# Patient Record
Sex: FEMALE | Race: WHITE | NOT HISPANIC OR LATINO | Employment: OTHER | URBAN - METROPOLITAN AREA
[De-identification: names, ages, dates, MRNs, and addresses within clinical notes are randomized per-mention and may not be internally consistent; named-entity substitution may affect disease eponyms.]

---

## 2023-08-28 PROBLEM — E78.5 HYPERLIPIDEMIA: Status: ACTIVE | Noted: 2022-01-31

## 2023-08-28 PROBLEM — K21.9 HIATAL HERNIA WITH GERD: Status: ACTIVE | Noted: 2022-01-31

## 2023-08-28 PROBLEM — K44.9 HIATAL HERNIA WITH GERD: Status: ACTIVE | Noted: 2022-01-31

## 2023-08-28 PROBLEM — G47.33 OBSTRUCTIVE SLEEP APNEA OF ADULT: Status: ACTIVE | Noted: 2022-01-31

## 2023-08-28 PROBLEM — R13.10 DYSPHAGIA, UNSPECIFIED: Status: ACTIVE | Noted: 2022-01-31

## 2023-08-28 PROBLEM — F98.8 ATTENTION DEFICIT DISORDER (ADD) WITHOUT HYPERACTIVITY: Status: ACTIVE | Noted: 2022-08-10

## 2023-08-28 PROBLEM — E55.9 VITAMIN D DEFICIENCY: Status: ACTIVE | Noted: 2022-01-31

## 2023-08-28 PROBLEM — N18.31 CHRONIC RENAL FAILURE, STAGE 3A (HCC): Status: ACTIVE | Noted: 2022-08-10

## 2023-08-28 PROBLEM — R73.03 PRE-DIABETES: Status: ACTIVE | Noted: 2022-01-31

## 2023-08-28 NOTE — PATIENT INSTRUCTIONS
[facility]: 7a-1pm Mon-    Please complete lab work fasting, will reach out with results    We are so happy to have you at our practice. Reach out anytime!

## 2023-08-28 NOTE — PROGRESS NOTES
Name: Zaki Morgan      :       MRN: [id number]  Encounter Provider: ELI Hester  Encounter Date: 2023   Encounter department: [address] [place] [address] PRIMARY CARE    Assessment & Plan     Patient presents today to establish care at our practice. Baseline labs ordered. DEXA done 5/15/23 with [address], care gap request sent    Negative cologuard test from 2023    See [facility] specific charting    Follow up in 3 months or sooner pending test results      1. Encounter to establish care  -     CBC and differential; Future  -     Comprehensive metabolic panel; Future  -     Lipid Panel with Direct LDL reflex; Future  -     Hemoglobin A1C; Future  -     Vitamin D 25 hydroxy; Future    2. Chronic renal failure, stage 3a ([address])  Assessment & Plan:  No results found for: "EGFR", "CREATININE"     Expressing concerns today regarding her kidney function. She reports "I saw in my blood work I have stage III kidney disease and no one ever told me". Reassurance was provided to the patient. She has a history of prediabetes but has been well controlled. She does not have a history of hypertension and her blood pressure is well controlled today. She has been increasing her fluid intake over the past year and follows with a nutritionist.  She reports losing approximately 30 pounds over the past year. She does have a history however of long-term NSAID use due to arthritis and fibromyalgia. This likely is a contributing factor. Will recheck metabolic panel as well as check a protein creatinine ratio urine test    Orders:  -     CBC and differential; Future  -     Comprehensive metabolic panel; Future  -     Protein / creatinine ratio, urine; Future    3. Obesity (BMI 30-39. 9)  Assessment & Plan:  Follows with Dr. Tory Loya nutrition team, reports losing "30 lbs over the past year", focus of program "is clean eating"    Orders:  -     Lipid Panel with Direct LDL reflex; Future  -     Hemoglobin A1C; Future    4. Hyperlipidemia, unspecified hyperlipidemia type  Assessment & Plan:  Noted in history, will recheck lipid panel  Note from past PCP states "patient is not tolerant to statins"    Orders:  -     Lipid Panel with Direct LDL reflex; Future    5. Pre-diabetes  Assessment & Plan:  Last A1c was 6.1% last year, will recheck hemoglobin A1c with baseline lab work    Orders:  -     Hemoglobin A1C; Future    6. Fibromyalgia  Assessment & Plan:  Patient used to follow with the [address] [place] [address] for this problem. She was on DMARDs for "quite a few years" as she originally was misdiagnosed as having rheumatoid arthritis. She now follows with [facility] and [address] [place] [address] neurosurgery, takes duloxetine 60 mg daily for mental health and fibromyalgia. 7. Vitamin D deficiency  Assessment & Plan:  Currently on cholecalciferol 2000 units daily, will add vitamin D lab to baseline lab work to reassess    Orders:  -     Vitamin D 25 hydroxy; Future    8. Need for hepatitis C screening test  -     Hepatitis C Antibody; Future    9. Attention deficit disorder (ADD) without hyperactivity  Assessment & Plan:  Well-controlled with Strattera 40 mg daily, will continue to monitor      10. Depression, unspecified depression type  Assessment & Plan:  [facility] managed by last P[facility]. Reports good control of symptoms with bupropion 150 mg daily and duloxetine 60 mg daily. 11. PTSD (post-traumatic stress disorder)  Assessment & Plan:  [facility], was most recently managed by last PCP. Reports good control of symptoms with bupropion 150 mg daily and duloxetine 60 mg daily. 12. Hiatal hernia with GERD  Assessment & Plan:  Patient reports good control with pantoprazole 40 mg daily, follows with DDA as needed      13.  Osteoarthritis, unspecified osteoarthritis type, unspecified site  Assessment & Plan:  Patient has an extensive musculoskeletal health history. She has had multiple joint replacement surgeries and revisions. She currently follows with orthopedics at [facility], she also follows with [address] [place] [address] neurosurgery team for management of right lower extremity radiculopathy        I have spent a total time of 60 minutes on 08/30/23 in caring for this patient including Risks and benefits of tx options, Instructions for management, Patient and family education, Importance of tx compliance, Documenting in the medical record, Reviewing / ordering tests, medicine, procedures   and Obtaining or reviewing history  . BMI Counseling: Body mass index is 34.3 kg/m². The BMI is above normal. Nutrition recommendations include encouraging healthy choices of fruits and vegetables and consuming healthier snacks. Exercise recommendations include exercising 3-5 times per week. Rationale for BMI follow-up plan is due to patient being overweight or obese. Subjective      Presents to establish care  Primary concern: CKD    See [facility] specific charting    Review of Systems   Constitutional: Negative. HENT: Negative. Eyes: Negative. Respiratory: Negative. Cardiovascular: Negative. Gastrointestinal: Negative. Endocrine: Negative. Genitourinary: Negative. Musculoskeletal: Positive for arthralgias. Skin: Negative. Allergic/Immunologic: Negative. Neurological: Negative. Hematological: Negative. Psychiatric/Behavioral: Negative.         Current Outpatient Medications on File Prior to Visit   Medication Sig   • atoMOXetine (STRATTERA) 40 mg capsule    • buPROPion (WELLBUTRIN XL) 150 mg 24 hr tablet    • cetirizine (ZyrTEC) 10 mg tablet Take by mouth   • Cholecalciferol 50 MCG (2000 UT) CAPS Take 1 capsule by mouth in the morning   • Cyanocobalamin (VITAMIN B-12 PO) Take by mouth   • DULoxetine (CYMBALTA) 60 mg delayed release capsule    • meloxicam (MOBIC) 15 mg tablet Take 1 tablet by mouth every morning   • nystatin (MYCOSTATIN) powder Apply 1 Application topically daily   • pantoprazole (PROTONIX) 40 mg tablet Take 40 mg by mouth daily   • [DISCONTINUED] ergocalciferol (ERGOCALCIFEROL) 1.25 MG (09508 UT) capsule Take by mouth   • [DISCONTINUED] omeprazole (PriLOSEC) 40 MG capsule Take by mouth (Patient not taking: Reported on 8/30/2023)   • [DISCONTINUED] pregabalin (LYRICA) 75 mg capsule Take by mouth (Patient not taking: Reported on 8/30/2023)   • [DISCONTINUED] venlafaxine (EFFEXOR-XR) 150 mg 24 hr capsule Take by mouth (Patient not taking: Reported on 8/30/2023)       Objective     /80 (BP Location: Left arm, Patient Position: Sitting, Cuff Size: Large)   Pulse 84   Temp 98.2 °F (36.8 °C) (Tympanic)   Resp 16   Ht 5' 1.5" (1.562 m)   Wt 83.7 kg (184 lb 8.4 oz)   SpO2 98%   BMI 34.30 kg/m²     Physical Exam  Constitutional:       General: She is not in acute distress. Appearance: Normal appearance. HENT:      Nose: Nose normal.   Eyes:      Extraocular Movements: Extraocular movements intact. Conjunctiva/sclera: Conjunctivae normal.   Cardiovascular:      Rate and Rhythm: Normal rate and regular rhythm. Heart sounds: Normal heart sounds. Pulmonary:      Effort: Pulmonary effort is normal. No respiratory distress. Breath sounds: Normal breath sounds. No wheezing. Abdominal:      General: Abdomen is flat. Musculoskeletal:         General: No swelling or deformity. Normal range of motion. Cervical back: Normal range of motion. Skin:     Findings: No erythema or rash. Neurological:      Mental Status: She is alert and oriented to person, place, and time.       Gait: Gait abnormal.   Psychiatric:         Mood and Affect: Mood normal.         Behavior: Behavior normal.       ELI Quarles

## 2023-08-29 ENCOUNTER — TELEPHONE (OUTPATIENT)
Dept: ADMINISTRATIVE | Facility: OTHER | Age: 78
End: 2023-08-29

## 2023-08-29 NOTE — TELEPHONE ENCOUNTER
Upon review of the In Basket request we IT is working on correcting the [employer]. The requested item will be linked by the system at that time. Any additional questions or concerns should be emailed to the Practice Liaisons via the appropriate education email address, please do not reply via In Basket.     Thank you  Mart Talavera MA

## 2023-08-29 NOTE — TELEPHONE ENCOUNTER
----- Message from Geneva Ramos, [facility] sent at 8/28/2023  2:17 PM EDT -----  Regarding: Care Gap Request  08/28/23 2:17 PM    Hello, our patient attached above has had CRC: Cologuard completed/performed. Please assist in updating the patient chart by pulling the Care Everywhere (CE) document. The date of service is 5/8/23.      Thank you,  ELI Stroud  [facility] Dr[facility]

## 2023-08-30 ENCOUNTER — TELEPHONE (OUTPATIENT)
Dept: ADMINISTRATIVE | Facility: OTHER | Age: 78
End: 2023-08-30

## 2023-08-30 ENCOUNTER — OFFICE VISIT (OUTPATIENT)
Age: 78
End: 2023-08-30

## 2023-08-30 VITALS
HEART RATE: 84 BPM | SYSTOLIC BLOOD PRESSURE: 120 MMHG | WEIGHT: 184.53 LBS | RESPIRATION RATE: 16 BRPM | DIASTOLIC BLOOD PRESSURE: 80 MMHG | TEMPERATURE: 98.2 F | OXYGEN SATURATION: 98 % | HEIGHT: 62 IN | BODY MASS INDEX: 33.96 KG/M2

## 2023-08-30 DIAGNOSIS — F43.10 PTSD (POST-TRAUMATIC STRESS DISORDER): ICD-10-CM

## 2023-08-30 DIAGNOSIS — E66.9 OBESITY (BMI 30-39.9): ICD-10-CM

## 2023-08-30 DIAGNOSIS — Z76.89 ENCOUNTER TO ESTABLISH CARE: Primary | ICD-10-CM

## 2023-08-30 DIAGNOSIS — M79.7 FIBROMYALGIA: ICD-10-CM

## 2023-08-30 DIAGNOSIS — F32.A DEPRESSION, UNSPECIFIED DEPRESSION TYPE: ICD-10-CM

## 2023-08-30 DIAGNOSIS — R73.03 PRE-DIABETES: ICD-10-CM

## 2023-08-30 DIAGNOSIS — Z11.59 NEED FOR HEPATITIS C SCREENING TEST: ICD-10-CM

## 2023-08-30 DIAGNOSIS — K21.9 HIATAL HERNIA WITH GERD: ICD-10-CM

## 2023-08-30 DIAGNOSIS — M19.90 OSTEOARTHRITIS, UNSPECIFIED OSTEOARTHRITIS TYPE, UNSPECIFIED SITE: ICD-10-CM

## 2023-08-30 DIAGNOSIS — K44.9 HIATAL HERNIA WITH GERD: ICD-10-CM

## 2023-08-30 DIAGNOSIS — F98.8 ATTENTION DEFICIT DISORDER (ADD) WITHOUT HYPERACTIVITY: ICD-10-CM

## 2023-08-30 DIAGNOSIS — E78.5 HYPERLIPIDEMIA, UNSPECIFIED HYPERLIPIDEMIA TYPE: ICD-10-CM

## 2023-08-30 DIAGNOSIS — E55.9 VITAMIN D DEFICIENCY: ICD-10-CM

## 2023-08-30 DIAGNOSIS — N18.31 CHRONIC RENAL FAILURE, STAGE 3A (HCC): ICD-10-CM

## 2023-08-30 PROBLEM — R13.10 DYSPHAGIA, UNSPECIFIED: Status: RESOLVED | Noted: 2022-01-31 | Resolved: 2023-08-30

## 2023-08-30 RX ORDER — CETIRIZINE HYDROCHLORIDE 10 MG/1
TABLET ORAL
COMMUNITY

## 2023-08-30 RX ORDER — MELOXICAM 15 MG/1
1 TABLET ORAL EVERY MORNING
COMMUNITY
Start: 1998-05-25 | End: 2024-01-04

## 2023-08-30 RX ORDER — VENLAFAXINE HYDROCHLORIDE 150 MG/1
CAPSULE, EXTENDED RELEASE ORAL
COMMUNITY
End: 2023-08-30

## 2023-08-30 RX ORDER — OMEPRAZOLE 40 MG/1
CAPSULE, DELAYED RELEASE ORAL
COMMUNITY
End: 2023-08-30

## 2023-08-30 RX ORDER — PREGABALIN 75 MG/1
CAPSULE ORAL
COMMUNITY
End: 2023-08-30

## 2023-08-30 RX ORDER — NYSTATIN 100000 [USP'U]/G
1 POWDER TOPICAL DAILY
COMMUNITY
Start: 2022-12-21 | End: 2023-12-21

## 2023-08-30 RX ORDER — PANTOPRAZOLE SODIUM 40 MG/1
40 TABLET, DELAYED RELEASE ORAL DAILY
COMMUNITY

## 2023-08-30 RX ORDER — BUPROPION HYDROCHLORIDE 150 MG/1
TABLET ORAL
COMMUNITY
Start: 2023-07-31

## 2023-08-30 RX ORDER — ERGOCALCIFEROL 1.25 MG/1
CAPSULE ORAL
COMMUNITY
End: 2023-08-30

## 2023-08-30 RX ORDER — DULOXETIN HYDROCHLORIDE 60 MG/1
CAPSULE, DELAYED RELEASE ORAL
COMMUNITY
Start: 2023-07-29

## 2023-08-30 RX ORDER — ATOMOXETINE 40 MG/1
CAPSULE ORAL
COMMUNITY
Start: 2023-07-31

## 2023-08-30 NOTE — ASSESSMENT & PLAN NOTE
Patient used to follow with the [place] for this problem. She was on DMARDs for "quite a few years" as she originally was misdiagnosed as having rheumatoid arthritis. She now follows with [facility] and [address] [place] [address] neurosurgery, takes duloxetine 60 mg daily for mental health and fibromyalgia.

## 2023-08-30 NOTE — ASSESSMENT & PLAN NOTE
No results found for: "EGFR", "CREATININE"     Expressing concerns today regarding her kidney function. She reports "I saw in my blood work I have stage III kidney disease and no one ever told me". Reassurance was provided to the patient. She has a history of prediabetes but has been well controlled. She does not have a history of hypertension and her blood pressure is well controlled today. She has been increasing her fluid intake over the past year and follows with a nutritionist.  She reports losing approximately 30 pounds over the past year. She does have a history however of long-term NSAID use due to arthritis and fibromyalgia. This likely is a contributing factor.     Will recheck metabolic panel as well as check a protein creatinine ratio urine test

## 2023-08-30 NOTE — ASSESSMENT & PLAN NOTE
Noted in history, will recheck lipid panel  Note from past PCP states "patient is not tolerant to statins"

## 2023-08-30 NOTE — ASSESSMENT & PLAN NOTE
Currently on cholecalciferol 2000 units daily, will add vitamin D lab to baseline lab work to reassess

## 2023-08-30 NOTE — ASSESSMENT & PLAN NOTE
Patient has an extensive musculoskeletal health history. She has had multiple joint replacement surgeries and revisions.   She currently follows with orthopedics at [facility], she also follows with [address] [place] [address] neurosurgery team for management of right lower extremity radiculopathy

## 2023-08-30 NOTE — ASSESSMENT & PLAN NOTE
Follows with Dr. Eduardo Villa nutrition team, reports losing "30 lbs over the past year", focus of program "is clean eating"

## 2023-08-30 NOTE — TELEPHONE ENCOUNTER
----- Message from Christel Donaldson sent at 8/30/2023 11:08 AM EDT -----  Regarding: Care Gap Request  08/30/23 11:08 AM    Hello, our patient attached above has had DEXA Scan completed/performed. Please assist in updating the patient chart by pulling the Care Everywhere (CE) document. The date of service is 5/15/23 with [address] in [place].      Thank you,  ELI Donaldson  [facility] Dr[facility]

## 2023-08-30 NOTE — ASSESSMENT & PLAN NOTE
Used to be managed by the [facility], was most recently managed by last PCP. Reports good control of symptoms with bupropion 150 mg daily and duloxetine 60 mg daily.

## 2023-08-30 NOTE — TELEPHONE ENCOUNTER
Upon review of the In Basket request we were able to locate, review, and update the patient chart as requested for DEXA Scan. Any additional questions or concerns should be emailed to the Practice Liaisons via the appropriate education email address, please do not reply via In Basket.     Thank you  Karla Vieira

## 2023-08-30 NOTE — ASSESSMENT & PLAN NOTE
Used to be managed by the [address] [place] [address], was most recently managed by last PCP. Reports good control of symptoms with bupropion 150 mg daily and duloxetine 60 mg daily.

## 2023-09-01 ENCOUNTER — APPOINTMENT (OUTPATIENT)
Age: 78
End: 2023-09-01
Payer: MEDICARE

## 2023-09-01 DIAGNOSIS — Z11.59 NEED FOR HEPATITIS C SCREENING TEST: ICD-10-CM

## 2023-09-01 DIAGNOSIS — R73.03 PRE-DIABETES: ICD-10-CM

## 2023-09-01 DIAGNOSIS — N18.31 CHRONIC RENAL FAILURE, STAGE 3A (HCC): ICD-10-CM

## 2023-09-01 DIAGNOSIS — E78.5 HYPERLIPIDEMIA, UNSPECIFIED HYPERLIPIDEMIA TYPE: ICD-10-CM

## 2023-09-01 DIAGNOSIS — Z76.89 ENCOUNTER TO ESTABLISH CARE: ICD-10-CM

## 2023-09-01 DIAGNOSIS — E55.9 VITAMIN D DEFICIENCY: ICD-10-CM

## 2023-09-01 DIAGNOSIS — E66.9 OBESITY (BMI 30-39.9): ICD-10-CM

## 2023-09-01 LAB
25(OH)D3 SERPL-MCNC: 43 NG/ML (ref 30–100)
ALBUMIN SERPL BCP-MCNC: 3.9 G/DL (ref 3.5–5)
ALP SERPL-CCNC: 95 U/L (ref 34–104)
ALT SERPL W P-5'-P-CCNC: 9 U/L (ref 7–52)
ANION GAP SERPL CALCULATED.3IONS-SCNC: 11 MMOL/L
AST SERPL W P-5'-P-CCNC: 18 U/L (ref 13–39)
BASOPHILS # BLD AUTO: 0.05 THOUSANDS/ÂΜL (ref 0–0.1)
BASOPHILS NFR BLD AUTO: 1 % (ref 0–1)
BILIRUB SERPL-MCNC: 0.47 MG/DL (ref 0.2–1)
BUN SERPL-MCNC: 22 MG/DL (ref 5–25)
CALCIUM SERPL-MCNC: 9 MG/DL (ref 8.4–10.2)
CHLORIDE SERPL-SCNC: 102 MMOL/L (ref 96–108)
CHOLEST SERPL-MCNC: 260 MG/DL
CO2 SERPL-SCNC: 27 MMOL/L (ref 21–32)
CREAT SERPL-MCNC: 1.07 MG/DL (ref 0.6–1.3)
CREAT UR-MCNC: 72 MG/DL
EOSINOPHIL # BLD AUTO: 0.16 THOUSAND/ÂΜL (ref 0–0.61)
EOSINOPHIL NFR BLD AUTO: 3 % (ref 0–6)
ERYTHROCYTE [DISTWIDTH] IN BLOOD BY AUTOMATED COUNT: 12.6 % (ref 11.6–15.1)
GFR SERPL CREATININE-BSD FRML MDRD: 50 ML/MIN/1.73SQ M
GLUCOSE P FAST SERPL-MCNC: 100 MG/DL (ref 65–99)
HCT VFR BLD AUTO: 45.1 % (ref 34.8–46.1)
HDLC SERPL-MCNC: 67 MG/DL
HGB BLD-MCNC: 14.4 G/DL (ref 11.5–15.4)
IMM GRANULOCYTES # BLD AUTO: 0.02 THOUSAND/UL (ref 0–0.2)
IMM GRANULOCYTES NFR BLD AUTO: 0 % (ref 0–2)
LDLC SERPL CALC-MCNC: 177 MG/DL (ref 0–100)
LYMPHOCYTES # BLD AUTO: 1.6 THOUSANDS/ÂΜL (ref 0.6–4.47)
LYMPHOCYTES NFR BLD AUTO: 26 % (ref 14–44)
MCH RBC QN AUTO: 30.7 PG (ref 26.8–34.3)
MCHC RBC AUTO-ENTMCNC: 31.9 G/DL (ref 31.4–37.4)
MCV RBC AUTO: 96 FL (ref 82–98)
MONOCYTES # BLD AUTO: 0.39 THOUSAND/ÂΜL (ref 0.17–1.22)
MONOCYTES NFR BLD AUTO: 6 % (ref 4–12)
NEUTROPHILS # BLD AUTO: 3.85 THOUSANDS/ÂΜL (ref 1.85–7.62)
NEUTS SEG NFR BLD AUTO: 64 % (ref 43–75)
NRBC BLD AUTO-RTO: 0 /100 WBCS
PLATELET # BLD AUTO: 257 THOUSANDS/UL (ref 149–390)
PMV BLD AUTO: 10.3 FL (ref 8.9–12.7)
POTASSIUM SERPL-SCNC: 4.2 MMOL/L (ref 3.5–5.3)
PROT SERPL-MCNC: 6.9 G/DL (ref 6.4–8.4)
PROT UR-MCNC: 5 MG/DL
PROT/CREAT UR: 0.07 MG/G{CREAT} (ref 0–0.1)
RBC # BLD AUTO: 4.69 MILLION/UL (ref 3.81–5.12)
SODIUM SERPL-SCNC: 140 MMOL/L (ref 135–147)
TRIGL SERPL-MCNC: 81 MG/DL
WBC # BLD AUTO: 6.07 THOUSAND/UL (ref 4.31–10.16)

## 2023-09-01 PROCEDURE — 83036 HEMOGLOBIN GLYCOSYLATED A1C: CPT

## 2023-09-01 PROCEDURE — 82306 VITAMIN D 25 HYDROXY: CPT

## 2023-09-01 PROCEDURE — 80061 LIPID PANEL: CPT

## 2023-09-01 PROCEDURE — 84156 ASSAY OF PROTEIN URINE: CPT

## 2023-09-01 PROCEDURE — 86803 HEPATITIS C AB TEST: CPT

## 2023-09-01 PROCEDURE — 82570 ASSAY OF URINE CREATININE: CPT

## 2023-09-01 PROCEDURE — 85025 COMPLETE CBC W/AUTO DIFF WBC: CPT

## 2023-09-01 PROCEDURE — 80053 COMPREHEN METABOLIC PANEL: CPT

## 2023-09-01 PROCEDURE — 36415 COLL VENOUS BLD VENIPUNCTURE: CPT

## 2023-09-02 LAB — HCV AB SER QL: NORMAL

## 2023-09-05 LAB
EST. AVERAGE GLUCOSE BLD GHB EST-MCNC: 126 MG/DL
HBA1C MFR BLD: 6 %

## 2023-09-06 ENCOUNTER — TELEPHONE (OUTPATIENT)
Age: 78
End: 2023-09-06

## 2023-11-29 NOTE — PATIENT INSTRUCTIONS
COMPLETE LAB WORK 1 WEEK PRIOR TO FOLLOW-UP    Medicare Preventive Visit Patient Instructions  Thank you for completing your Welcome to Medicare Visit or Medicare Annual Wellness Visit today. Your next wellness visit will be due in one year (11/29/2024). The screening/preventive services that you may require over the next 5-10 years are detailed below. Some tests may not apply to you based off risk factors and/or age. Screening tests ordered at today's visit but not completed yet may show as past due. Also, please note that scanned in results may not display below. Preventive Screenings:  Service Recommendations Previous Testing/Comments   Colorectal Cancer Screening  * Colonoscopy    * Fecal Occult Blood Test (FOBT)/Fecal Immunochemical Test (FIT)  * Fecal DNA/Cologuard Test  * Flexible Sigmoidoscopy Age: 43-73 years old   Colonoscopy: every 10 years (may be performed more frequently if at higher risk)  OR  FOBT/FIT: every 1 year  OR  Cologuard: every 3 years  OR  Sigmoidoscopy: every 5 years  Screening may be recommended earlier than age 39 if at higher risk for colorectal cancer. Also, an individualized decision between you and your healthcare provider will decide whether screening between the ages of 77-80 would be appropriate. Colonoscopy: 05/08/2023  FOBT/FIT: Not on file  Cologuard: Not on file  Sigmoidoscopy: Not on file          Breast Cancer Screening Age: 36 years old  Frequency: every 1-2 years  Not required if history of left and right mastectomy Mammogram: 03/30/2023        Cervical Cancer Screening Between the ages of 21-29, pap smear recommended once every 3 years. Between the ages of 32-69, can perform pap smear with HPV co-testing every 5 years.    Recommendations may differ for women with a history of total hysterectomy, cervical cancer, or abnormal pap smears in past. Pap Smear: Not on file        Hepatitis C Screening Once for adults born between 1945 and 1965  More frequently in patients at high risk for Hepatitis C Hep C Antibody: 09/01/2023        Diabetes Screening 1-2 times per year if you're at risk for diabetes or have pre-diabetes Fasting glucose: 100 mg/dL (9/1/2023)  A1C: 6.0 % (9/1/2023)      Cholesterol Screening Once every 5 years if you don't have a lipid disorder. May order more often based on risk factors. Lipid panel: 09/01/2023          Other Preventive Screenings Covered by Medicare:  Abdominal Aortic Aneurysm (AAA) Screening: covered once if your at risk. You're considered to be at risk if you have a family history of AAA. Lung Cancer Screening: covers low dose CT scan once per year if you meet all of the following conditions: (1) Age 48-67; (2) No signs or symptoms of lung cancer; (3) Current smoker or have quit smoking within the last 15 years; (4) You have a tobacco smoking history of at least 20 pack years (packs per day multiplied by number of years you smoked); (5) You get a written order from a healthcare provider. Glaucoma Screening: covered annually if you're considered high risk: (1) You have diabetes OR (2) Family history of glaucoma OR (3)  aged 48 and older OR (3)  American aged 72 and older  Osteoporosis Screening: covered every 2 years if you meet one of the following conditions: (1) You're estrogen deficient and at risk for osteoporosis based off medical history and other findings; (2) Have a vertebral abnormality; (3) On glucocorticoid therapy for more than 3 months; (4) Have primary hyperparathyroidism; (5) On osteoporosis medications and need to assess response to drug therapy. Last bone density test (DXA Scan): 05/15/2023. HIV Screening: covered annually if you're between the age of 14-79. Also covered annually if you are younger than 13 and older than 72 with risk factors for HIV infection. For pregnant patients, it is covered up to 3 times per pregnancy.     Immunizations:  Immunization Recommendations   Influenza Vaccine Annual influenza vaccination during flu season is recommended for all persons aged >= 6 months who do not have contraindications   Pneumococcal Vaccine   * Pneumococcal conjugate vaccine = PCV13 (Prevnar 13), PCV15 (Vaxneuvance), PCV20 (Prevnar 20)  * Pneumococcal polysaccharide vaccine = PPSV23 (Pneumovax) Adults 98-22 yo with certain risk factors or if 69+ yo  If never received any pneumonia vaccine: recommend Prevnar 20 (PCV20)  Give PCV20 if previously received 1 dose of PCV13 or PPSV23   Hepatitis B Vaccine 3 dose series if at intermediate or high risk (ex: diabetes, end stage renal disease, liver disease)   Respiratory syncytial virus (RSV) Vaccine - COVERED BY MEDICARE PART D  * RSVPreF3 (Arexvy) CDC recommends that adults 61years of age and older may receive a single dose of RSV vaccine using shared clinical decision-making (SCDM)   Tetanus (Td) Vaccine - COST NOT COVERED BY MEDICARE PART B Following completion of primary series, a booster dose should be given every 10 years to maintain immunity against tetanus. Td may also be given as tetanus wound prophylaxis. Tdap Vaccine - COST NOT COVERED BY MEDICARE PART B Recommended at least once for all adults. For pregnant patients, recommended with each pregnancy. Shingles Vaccine (Shingrix) - COST NOT COVERED BY MEDICARE PART B  2 shot series recommended in those 19 years and older who have or will have weakened immune systems or those 50 years and older     Health Maintenance Due:      Topic Date Due    Hepatitis C Screening  Completed     Immunizations Due:      Topic Date Due    Pneumococcal Vaccine: 65+ Years (3 - PPSV23 if available, else PCV20) 08/27/2016     Advance Directives   What are advance directives? Advance directives are legal documents that state your wishes and plans for medical care. These plans are made ahead of time in case you lose your ability to make decisions for yourself.  Advance directives can apply to any medical decision, such as the treatments you want, and if you want to donate organs. What are the types of advance directives? There are many types of advance directives, and each state has rules about how to use them. You may choose a combination of any of the following:  Living will: This is a written record of the treatment you want. You can also choose which treatments you do not want, which to limit, and which to stop at a certain time. This includes surgery, medicine, IV fluid, and tube feedings. Durable power of  for healthcare [facility]): This is a written record that states who you want to make healthcare choices for you when you are unable to make them for yourself. This person, called a proxy, is usually a family member or a friend. You may choose more than 1 proxy. Do not resuscitate (DNR) order:  A DNR order is used in case your heart stops beating or you stop breathing. It is a request not to have certain forms of treatment, such as CPR. A DNR order may be included in other types of advance directives. Medical directive: This covers the care that you want if you are in a coma, near death, or unable to make decisions for yourself. You can list the treatments you want for each condition. Treatment may include pain medicine, surgery, blood transfusions, dialysis, IV or tube feedings, and a ventilator (breathing machine). Values history: This document has questions about your views, beliefs, and how you feel and think about life. This information can help others choose the care that you would choose. Why are advance directives important? An advance directive helps you control your care. Although spoken wishes may be used, it is better to have your wishes written down. Spoken wishes can be misunderstood, or not followed. Treatments may be given even if you do not want them. An advance directive may make it easier for your family to make difficult choices about your care.    Weight Management   Why it is important to manage your weight:  Being overweight increases your risk of health conditions such as heart disease, high blood pressure, type 2 diabetes, and certain types of cancer. It can also increase your risk for osteoarthritis, sleep apnea, and other respiratory problems. Aim for a slow, steady weight loss. Even a small amount of weight loss can lower your risk of health problems. How to lose weight safely:  A safe and healthy way to lose weight is to eat fewer calories and get regular exercise. You can lose up about 1 pound a week by decreasing the number of calories you eat by 500 calories each day. Healthy meal plan for weight management:  A healthy meal plan includes a variety of foods, contains fewer calories, and helps you stay healthy. A healthy meal plan includes the following:  Eat whole-grain foods more often. A healthy meal plan should contain fiber. Fiber is the part of grains, fruits, and vegetables that is not broken down by your body. Whole-grain foods are healthy and provide extra fiber in your diet. Some examples of whole-grain foods are whole-wheat breads and pastas, oatmeal, brown rice, and bulgur. Eat a variety of vegetables every day. Include dark, leafy greens such as spinach, kale, mansoor greens, and mustard greens. Eat yellow and orange vegetables such as carrots, sweet potatoes, and winter squash. Eat a variety of fruits every day. Choose fresh or canned fruit (canned in its own juice or light syrup) instead of juice. Fruit juice has very little or no fiber. Eat low-fat dairy foods. Drink fat-free (skim) milk or 1% milk. Eat fat-free yogurt and low-fat cottage cheese. Try low-fat cheeses such as mozzarella and other reduced-fat cheeses. Choose meat and other protein foods that are low in fat. Choose beans or other legumes such as split peas or lentils. Choose fish, skinless poultry (chicken or turkey), or lean cuts of red meat (beef or pork).  Before you cook meat or poultry, cut off any visible fat. Use less fat and oil. Try baking foods instead of frying them. Add less fat, such as margarine, sour cream, regular salad dressing and mayonnaise to foods. Eat fewer high-fat foods. Some examples of high-fat foods include french fries, doughnuts, ice cream, and cakes. Eat fewer sweets. Limit foods and drinks that are high in sugar. This includes candy, cookies, regular soda, and sweetened drinks. Exercise:  Exercise at least 30 minutes per day on most days of the week. Some examples of exercise include walking, biking, dancing, and swimming. You can also fit in more physical activity by taking the stairs instead of the elevator or parking farther away from stores. Ask your healthcare provider about the best exercise plan for you. © Copyright [employer] 2018 Information is for End User's use only and may not be sold, redistributed or otherwise used for commercial purposes.  All illustrations and images included in CareNotes® are the copyrighted property of [employer].A[employer]M., Inc. or  [employer]

## 2023-11-30 ENCOUNTER — OFFICE VISIT (OUTPATIENT)
Age: 78
End: 2023-11-30
Payer: MEDICARE

## 2023-11-30 VITALS
OXYGEN SATURATION: 98 % | RESPIRATION RATE: 16 BRPM | DIASTOLIC BLOOD PRESSURE: 84 MMHG | HEART RATE: 82 BPM | SYSTOLIC BLOOD PRESSURE: 130 MMHG | BODY MASS INDEX: 35.42 KG/M2 | WEIGHT: 187.61 LBS | HEIGHT: 61 IN

## 2023-11-30 DIAGNOSIS — Z13.0 SCREENING FOR DEFICIENCY ANEMIA: ICD-10-CM

## 2023-11-30 DIAGNOSIS — M25.511 CHRONIC RIGHT SHOULDER PAIN: ICD-10-CM

## 2023-11-30 DIAGNOSIS — N18.31 CHRONIC RENAL FAILURE, STAGE 3A (HCC): ICD-10-CM

## 2023-11-30 DIAGNOSIS — F32.A DEPRESSION, UNSPECIFIED DEPRESSION TYPE: ICD-10-CM

## 2023-11-30 DIAGNOSIS — Z00.00 MEDICARE ANNUAL WELLNESS VISIT, INITIAL: Primary | ICD-10-CM

## 2023-11-30 DIAGNOSIS — Z23 ENCOUNTER FOR IMMUNIZATION: ICD-10-CM

## 2023-11-30 DIAGNOSIS — G89.29 CHRONIC RIGHT SHOULDER PAIN: ICD-10-CM

## 2023-11-30 DIAGNOSIS — E66.01 OBESITY, MORBID (HCC): ICD-10-CM

## 2023-11-30 DIAGNOSIS — G24.5 EYE TWITCH: ICD-10-CM

## 2023-11-30 DIAGNOSIS — E78.5 HYPERLIPIDEMIA, UNSPECIFIED HYPERLIPIDEMIA TYPE: ICD-10-CM

## 2023-11-30 DIAGNOSIS — R73.03 PRE-DIABETES: ICD-10-CM

## 2023-11-30 PROCEDURE — 99214 OFFICE O/P EST MOD 30 MIN: CPT | Performed by: NURSE PRACTITIONER

## 2023-11-30 PROCEDURE — G0438 PPPS, INITIAL VISIT: HCPCS | Performed by: NURSE PRACTITIONER

## 2023-11-30 RX ORDER — PREGABALIN 75 MG/1
CAPSULE ORAL
COMMUNITY

## 2023-11-30 NOTE — ASSESSMENT & PLAN NOTE
Ongoing since 2022, first noticed after knee surgeries   "Acting up" over the past month  Meloxicam and Tylenol: not working    Per shared decision making, will refer to Dr. Lenora Del Valle for eval and treatment

## 2023-11-30 NOTE — ASSESSMENT & PLAN NOTE
Likely secondary to long-term NSAID use  Advised patient to use NSAIDs only as needed  Discussed adequate hydration  Discussed the importance of blood pressure and blood sugar control    We will recheck metabolic panel prior to follow-up    Lab Results   Component Value Date    EGFR 50 09/01/2023    CREATININE 1.07 09/01/2023

## 2023-11-30 NOTE — ASSESSMENT & PLAN NOTE
Patient reports left eye twitch, ongoing for several months  Patient saw the eye doctor, patient states "they blew it off"  Patient reports that the eye twitching is bothersome  Patient denies any associated/correlating symptoms  Denies vision changes to left eye  Notices frequently while driving    Neuro exam benign today. Patient follows with [address] [place] [address] neurology. I strongly advised that the patient contact her neurology team for further evaluation.

## 2023-11-30 NOTE — ASSESSMENT & PLAN NOTE
Lab Results   Component Value Date    HGBA1C 6.0 (H) 09/01/2023       PRIMARY CARE PHYSICIAN: ELI Plasencia  Most recent appointment with PCP: 11/30/2023  Next appointment with PCP: 06/05/2024    Continue heart healthy diabetic diet  Exercise as tolerated

## 2023-11-30 NOTE — PROGRESS NOTES
Assessment/Plan:    Encouraged neurology follow-up to further assess left eye twitching. Neuro exam benign today. Repeat lab work prior to follow-up    Referral placed to orthopedics, Dr. Uriel Nevarez, for evaluation and treatment of right shoulder pain      Obesity, morbid ([address])  Following with Dr. Yared Tsang in [facility] for weight loss  "I've been slacking lately"    Chronic right shoulder pain  Ongoing since 2022, first noticed after knee surgeries   "Acting up" over the past month  Meloxicam and Tylenol: not working    Per shared decision making, will refer to Dr. Uriel Nevarez for eval and treatment    Eye twitch  Patient reports left eye twitch, ongoing for several months  Patient saw the eye doctor, patient states "they blew it off"  Patient reports that the eye twitching is bothersome  Patient denies any associated/correlating symptoms  Denies vision changes to left eye  Notices frequently while driving    Neuro exam benign today. Patient follows with [address] [place] [address] neurology. I strongly advised that the patient contact her neurology team for further evaluation. Chronic renal failure, stage 3a (HCC)  Likely secondary to long-term NSAID use  Advised patient to use NSAIDs only as needed  Discussed adequate hydration  Discussed the importance of blood pressure and blood sugar control    We will recheck metabolic panel prior to follow-up    Lab Results   Component Value Date    EGFR 50 09/01/2023    CREATININE 1.07 09/01/2023       Depression  Patient follows with the [address] [place] [address] for management of mental health conditions such as depression and ADHD    Hyperlipidemia  LDL continues to be above goal.  ASCVD risk of 18.7%. Patient reports she is intolerant to statins, "they made my legs feel bad and negatively affected my walking". I discussed the risks associated with elevated cholesterol values, also discussed the clinical significance of her current ASCVD risk score. Patient is hesitant to retry statins.   I provided the patient with an educational handout on ezetimibe, patient expressed moderate interest.  Patient plans on researching this medication and may be willing to start it at her next follow-up if necessary. Will recheck lipid panel prior to follow-up    Pre-diabetes  Lab Results   Component Value Date    HGBA1C 6.0 (H) 09/01/2023       PRIMARY CARE PHYSICIAN: ELI Villela  Most recent appointment with PCP: 11/30/2023  Next appointment with PCP: 06/05/2024    Continue heart healthy diabetic diet  Exercise as tolerated        Subjective:      Patient ID: Griselda Grullon is a 70 y.o. female. Routine follow-up to reassess chronic health conditions, last visit 3 months ago  Primary concern today: R shoulder pain and L eye twitch         The following portions of the patient's history were reviewed and updated as appropriate: allergies, current medications, past family history, past medical history, past social history, past surgical history, and problem list.    Review of Systems   Constitutional: Negative. HENT: Negative. Eyes: Negative. Negative for photophobia, pain, discharge, redness, itching and visual disturbance. L eye twitch   Respiratory: Negative. Cardiovascular: Negative. Gastrointestinal: Negative. Endocrine: Negative. Genitourinary: Negative. Musculoskeletal:  Positive for arthralgias. Skin: Negative. Allergic/Immunologic: Negative. Neurological: Negative. Negative for dizziness, tremors, seizures, syncope, facial asymmetry, speech difficulty, weakness, light-headedness, numbness and headaches. Hematological: Negative. Psychiatric/Behavioral: Negative. Objective:      /84 (BP Location: Left arm, Patient Position: Sitting, Cuff Size: Large)   Pulse 82   Resp 16   Ht 5' 1" (1.549 m)   Wt 85.1 kg (187 lb 9.8 oz)   SpO2 98%   BMI 35.45 kg/m²          Physical Exam  Constitutional:       General: She is not in acute distress. Appearance: Normal appearance. She is not ill-appearing. HENT:      Nose: Nose normal.   Eyes:      General: No visual field deficit. Right eye: No discharge. Left eye: No discharge. Extraocular Movements: Extraocular movements intact. Conjunctiva/sclera: Conjunctivae normal.      Pupils: Pupils are equal, round, and reactive to light. Cardiovascular:      Rate and Rhythm: Normal rate and regular rhythm. Heart sounds: Normal heart sounds. Pulmonary:      Effort: Pulmonary effort is normal. No respiratory distress. Breath sounds: Normal breath sounds. No wheezing. Abdominal:      General: Abdomen is flat. Musculoskeletal:         General: No swelling or deformity. Normal range of motion. Right shoulder: Tenderness and bony tenderness present. No swelling or deformity. Normal range of motion. Left shoulder: No swelling, deformity, tenderness or bony tenderness. Normal range of motion. Cervical back: Normal range of motion. Skin:     Findings: No erythema or rash. Neurological:      Mental Status: She is alert and oriented to person, place, and time. Cranial Nerves: Cranial nerves 2-12 are intact. No cranial nerve deficit, dysarthria or facial asymmetry. Sensory: Sensation is intact. Motor: Motor function is intact. No weakness, tremor or pronator drift. Coordination: Coordination is intact. Romberg sign negative.  Rapid alternating movements normal.   Psychiatric:         Mood and Affect: Mood normal.         Behavior: Behavior normal.

## 2023-11-30 NOTE — ASSESSMENT & PLAN NOTE
LDL continues to be above goal.  ASCVD risk of 18.7%. Patient reports she is intolerant to statins, "they made my legs feel bad and negatively affected my walking". I discussed the risks associated with elevated cholesterol values, also discussed the clinical significance of her current ASCVD risk score. Patient is hesitant to retry statins. I provided the patient with an educational handout on ezetimibe, patient expressed moderate interest.  Patient plans on researching this medication and may be willing to start it at her next follow-up if necessary.   Will recheck lipid panel prior to follow-up

## 2023-11-30 NOTE — LETTER
November 30, 2023     MIGUELITO Morales MD  [id number] [facility]    Patient: Holley Lanes   YOB: 1945   Date of Visit: 11/30/2023       Dear Dr. Ronnie Paniagua: Thank you for referring Holley Lanes to me for evaluation. Below are my notes for this consultation. If you have questions, please do not hesitate to call me. I look forward to following your patient along with you.          Sincerely,        ELI Mendoza        CC: No Recipients

## 2023-11-30 NOTE — ASSESSMENT & PLAN NOTE
Following with Dr. Misael Abraham in [facility] for weight loss  "I've been slacking lately"

## 2023-11-30 NOTE — PROGRESS NOTES
Answers submitted by the patient for this visit:  Medicare Annual Wellness Visit (Submitted on 11/24/2023)  How would you rate your overall health?: good  Compared to last year, how is your physical health?: slightly better  In general, how satisfied are you with your life?: dissatisfied  Compared to last year, how is your eyesight?: same  Compared to last year, how is your hearing?: same  Compared to last year, how is your emotional/mental health?: same  How often is anger a problem for you?: never, rarely  How often do you feel unusually tired/fatigued?: often  In the past 7 days, how much pain have you experienced?: a lot  If you answered "some" or "a lot", please rate the severity of your pain on a scale of 1 to 10 (1 being the least severe pain and 10 being the most intense pain). : 6/10  In the past 6 months, have you lost or gained 10 pounds without trying?: No  One or more falls in the last year: Yes  In the past 6 months, have you accidentally leaked urine?: No  Do you have trouble with the stairs inside or outside your home?: Yes  Does your home have working smoke alarms?: No  Does your home have a carbon monoxide monitor?: No  Which safety hazards (if any) have you experienced in your home? Please select all that apply.: household clutter  How would you describe your current diet?  Please select all that apply.: Unhealthy  In addition to prescription medications, are you taking any over-the-counter supplements?: Yes  If yes, what supplements are you taking?: Tylenol  Can you manage your medications?: Yes  Are you currently taking any opioid medications?: No  Can you walk and transfer into and out of your bed and chair?: Yes  Can you dress and groom yourself?: Yes  Can you bathe or shower yourself?: Yes  Can you feed yourself?: Yes  Can you do your laundry/ housekeeping?: Yes  Can you manage your money, pay your bills, and track your expenses?: Yes  Can you make your own meals?: Yes  Can you do your own shopping?: Yes  Within the last 12 months, have you had any hospitalizations or Emergency Department visits?: No  Do you have a living will?: No  Do you have a Durable POA (Power of ) for healthcare decisions?: No  Do you have an Advanced Directive for end of life decisions?: No  How often have you used an illegal drug (including marijuana) or a prescription medication for non-medical reasons in the past year?: never  What is the typical number of drinks you consume in a day?: 0  What is the typical number of drinks you consume in a week?: 0  How often did you have a drink containing alcohol in the past year?: never  How many drinks did you have on a typical day  when you were drinking in the past year?: 0  How often did you have 6 or more drinks on one occasion in the past year?: never   Assessment and Plan:     Problem List Items Addressed This Visit        Nervous and Auditory    Eye twitch     Patient reports left eye twitch, ongoing for several months  Patient saw the eye doctor, patient states "they blew it off"  Patient reports that the eye twitching is bothersome  Patient denies any associated/correlating symptoms  Denies vision changes to left eye  Notices frequently while driving    Neuro exam benign today. Patient follows with [address] [place] [address] neurology. I strongly advised that the patient contact her neurology team for further evaluation.          Relevant Medications    pregabalin (Lyrica) 75 mg capsule       Genitourinary    Chronic renal failure, stage 3a ([address])     Likely secondary to long-term NSAID use  Advised patient to use NSAIDs only as needed  Discussed adequate hydration  Discussed the importance of blood pressure and blood sugar control    We will recheck metabolic panel prior to follow-up    Lab Results   Component Value Date    EGFR 50 09/01/2023    CREATININE 1.07 09/01/2023            Relevant Orders    CBC and differential    Comprehensive metabolic panel       Other Depression     Patient follows with the [facility] for management of mental health conditions such as depression and ADHD         Hyperlipidemia     LDL continues to be above goal.  ASCVD risk of 18.7%. Patient reports she is intolerant to statins, "they made my legs feel bad and negatively affected my walking". I discussed the risks associated with elevated cholesterol values, also discussed the clinical significance of her current ASCVD risk score. Patient is hesitant to retry statins. I provided the patient with an educational handout on ezetimibe, patient expressed moderate interest.  Patient plans on researching this medication and may be willing to start it at her next follow-up if necessary.   Will recheck lipid panel prior to follow-up         Relevant Orders    Lipid Panel with Direct LDL reflex    Pre-diabetes     Lab Results   Component Value Date    HGBA1C 6.0 (H) 09/01/2023       PRIMARY CARE PHYSICIAN: ELI Morocho  Most recent appointment with PCP: 11/30/2023  Next appointment with PCP: 06/05/2024    Continue heart healthy diabetic diet  Exercise as tolerated           Relevant Orders    Hemoglobin A1C    Obesity, morbid ([address] [facility])     Following with Dr. Fercho Chao in [facility] for weight loss  "I've been slacking lately"         Chronic right shoulder pain     Ongoing since 2022, first noticed after knee surgeries   "Acting up" over the past month  Meloxicam and Tylenol: not working    Per shared decision making, will refer to Dr. Echo Culver for eval and treatment         Relevant Orders    Ambulatory Referral to Orthopedic Surgery   Other Visit Diagnoses     Medicare annual wellness visit, initial    -  Primary    Encounter for immunization        Relevant Medications    RSVPreF3 Vac Recomb Adjuvanted 120 MCG/0.5ML SUSR    Screening for deficiency anemia        Relevant Orders    CBC and differential           Preventive health issues were discussed with patient, and age appropriate screening tests were ordered as noted in patient's After Visit Summary. Personalized health advice and appropriate referrals for health education or preventive services given if needed, as noted in patient's After Visit Summary. History of Present Illness:     Patient presents for a Medicare Wellness Visit    Patient presents today for Medicare annual wellness visit  Up-to-date with all preventative screenings  Provided list updated: [facility], [facility] neurology, and orthopedics through [facility]  Up-to-date with all vaccinations, COVID booster administered at [facility] in October  Patient also follows with podiatry, [facility]    Eye Problem   Pertinent negatives include no photophobia or weakness. Shoulder Pain     Depression  Associated symptoms include arthralgias. Pertinent negatives include no weakness. Patient Care Team:  Guerda Reynaga as PCP - General (Nurse Practitioner)     Review of Systems:     Review of Systems   Constitutional: Negative. HENT: Negative. Eyes: Negative. Negative for photophobia and visual disturbance. L eye twitch   Respiratory: Negative. Cardiovascular: Negative. Gastrointestinal: Negative. Endocrine: Negative. Genitourinary: Negative. Musculoskeletal:  Positive for arthralgias. Skin: Negative. Allergic/Immunologic: Negative. Neurological: Negative. Negative for dizziness, tremors, facial asymmetry, speech difficulty, weakness and light-headedness. Hematological: Negative. Psychiatric/Behavioral:  Positive for depression.          Problem List:     Patient Active Problem List   Diagnosis   • Attention deficit disorder (ADD) without hyperactivity   • Chronic renal failure, stage 3a (HCC)   • Depression   • Fibromyalgia   • Hiatal hernia with GERD   • Hyperlipidemia   • Obstructive sleep apnea of adult   • Osteoarthritis (arthritis due to wear and tear of joints)   • Pre-diabetes • PTSD (post-traumatic stress disorder)   • Vitamin D deficiency   • Obesity (BMI 30-39. 9)   • Obesity, morbid (HCC)   • Chronic right shoulder pain   • Eye twitch      Past Medical and Surgical History:     Past Medical History:   Diagnosis Date   • Arthritis    • Chronic kidney disease 2023   • Depression    • Dysphagia, unspecified 2022   • GERD (gastroesophageal reflux disease)     Don't remember   • Headache(784.0)     Migraine   • Memory loss    • Visual impairment      Past Surgical History:   Procedure Laterality Date   • BREAST SURGERY  1986    Bilat reduction   • EYE SURGERY  cataracts Aug & Sep 2022   • FRACTURE SURGERY  L wrist  2017   • HIP SURGERY  both replaced, L revised   • JOINT REPLACEMENT  TKR (R) , ,     Both hips   • KNEE SURGERY  R 1975,   • SPINE SURGERY  cervical , lumbar       Family History:     Family History   Problem Relation Age of Onset   • Coronary artery disease Mother          Mar 1989   • Diabetes Mother         Type 2   • Arthritis Father    • Vision loss Father         Macular Degeneration   • Coronary artery disease Brother          2020   • Diabetes Brother         Type 2   • Diabetes Brother         Type 2   • Cancer Brother         Leukemia        Social History:     Social History     Socioeconomic History   • Marital status: Single     Spouse name: None   • Number of children: None   • Years of education: None   • Highest education level: None   Occupational History   • None   Tobacco Use   • Smoking status: Never   • Smokeless tobacco: Never   Vaping Use   • Vaping Use: Never used   Substance and Sexual Activity   • Alcohol use: Not Currently     Comment: maybe one during holidays   • Drug use: Never   • Sexual activity: Not Currently     Partners: Male     Birth control/protection: Abstinence, Condom Male   Other Topics Concern   • None   Social History Narrative   • None     Social Determinants of Health     Financial Resource Strain: Low Risk  (11/24/2023)    Overall Financial Resource Strain (CARDIA)    • Difficulty of Paying Living Expenses: Not hard at all   Food Insecurity: Not on file   Transportation Needs: No Transportation Needs (11/24/2023)    PRAPARE - Transportation    • Lack of Transportation (Medical): No    • Lack of Transportation (Non-Medical): No   Physical Activity: Not on file   Stress: Not on file   Social Connections: Not on file   Intimate Partner Violence: Not on file   Housing Stability: Not on file      Medications and Allergies:     Current Outpatient Medications   Medication Sig Dispense Refill   • atoMOXetine (STRATTERA) 40 mg capsule      • buPROPion (WELLBUTRIN XL) 150 mg 24 hr tablet      • cetirizine (ZyrTEC) 10 mg tablet Take by mouth     • Cholecalciferol 50 MCG (2000 UT) CAPS Take 1 capsule by mouth in the morning     • Cyanocobalamin (VITAMIN B-12 PO) Take by mouth     • DULoxetine (CYMBALTA) 60 mg delayed release capsule      • meloxicam (MOBIC) 15 mg tablet Take 1 tablet by mouth every morning     • nystatin (MYCOSTATIN) powder Apply 1 Application topically daily     • pantoprazole (PROTONIX) 40 mg tablet Take 40 mg by mouth daily     • RSVPreF3 Vac Recomb Adjuvanted 120 MCG/0.5ML SUSR Inject 0.5 mL into a muscle once for 1 dose 1 each 0   • pregabalin (Lyrica) 75 mg capsule Take by mouth (Patient not taking: Reported on 11/30/2023)       No current facility-administered medications for this visit.      Allergies   Allergen Reactions   • Gabapentin Other (See Comments)     "out of body experience"  "Out of body experience"     • Other Swelling     SURGICAL STEEL  pain   • Pregabalin Other (See Comments)     Blurred vision, off balance to the point point of falling   • Statins Arthralgia and Myalgia     Severe arthralgias   • Medical Tape Rash     welts        Immunizations:     Immunization History   Administered Date(s) Administered   • COVID-19 MODERNA VACC 0.5 ML IM 02/13/2021, 03/13/2021, 04/13/2021, 12/03/2021   • COVID-19 Moderna mRNA Vaccine 12 Yr+ 50 mcg/0.5 mL (Spikevax) 10/20/2023   • H1N1, All Formulations 12/10/2009   • INFLUENZA 11/04/1996, 10/27/1997, 12/18/2000, 11/19/2004, 11/23/2005, 10/01/2007, 11/01/2007, 10/01/2008, 10/23/2009, 10/08/2010, 10/01/2011, 10/12/2012, 09/11/2013, 09/19/2014, 10/16/2015, 10/01/2020   • Influenza Split 10/03/2022   • Influenza Split High Dose Preservative Free IM 10/30/2015, 10/19/2017, 03/13/2021, 09/14/2021   • Influenza, Seasonal Vaccine, Quadrivalent, Adjuvanted, .5e 09/14/2021, 10/03/2022   • Influenza, high dose seasonal 0.7 mL 10/05/2023   • Influenza, seasonal, injectable 11/04/1996, 10/27/1997, 09/11/2013, 10/01/2020   • Pneumococcal 11/14/2007, 12/17/2012   • Pneumococcal Conjugate 13-Valent 08/27/2015   • Pneumococcal Polysaccharide PPV23 11/01/2007   • Td (adult), Unspecified 04/21/2004, 04/24/2014   • Tdap 04/21/2004, 07/26/2010, 03/22/2018   • Zoster 03/08/2011   • Zoster Vaccine Recombinant 10/16/2019, 03/11/2020, 04/19/2023, 07/19/2023   • influenza, trivalent, adjuvanted 10/12/2012, 10/15/2016, 10/18/2018, 10/28/2019      Health Maintenance:         Topic Date Due   • Hepatitis C Screening  Completed         Topic Date Due   • Pneumococcal Vaccine: 65+ Years (3 - PPSV23 if available, else PCV20) 08/27/2016      Medicare Screening Tests and Risk Assessments:         Health Risk Assessment:   Patient rates overall health as good. Patient feels that their physical health rating is slightly better. Patient is dissatisfied with their life. Eyesight was rated as same. Hearing was rated as same. Patient feels that their emotional and mental health rating is same. Patients states they are never, rarely angry. Patient states they are often unusually tired/fatigued. Pain experienced in the last 7 days has been a lot. Patient's pain rating has been 6/10.  Patient states that she has experienced no weight loss or gain in last 6 months. Fall Risk Screening: In the past year, patient has experienced: history of falling in past year      Urinary Incontinence Screening:   Patient has not leaked urine accidently in the last six months. Home Safety:  Patient has trouble with stairs inside or outside of their home. Patient has no working smoke alarms and has no working carbon monoxide detector. Home safety hazards include: household clutter. Nutrition:   Current diet is Unhealthy. Medications:   Patient is currently taking over-the-counter supplements. OTC medications include: see medication list. Patient is able to manage medications. Activities of Daily Living (ADLs)/Instrumental Activities of Daily Living (IADLs):   Walk and transfer into and out of bed and chair?: Yes  Dress and groom yourself?: Yes    Bathe or shower yourself?: Yes    Feed yourself? Yes  Do your laundry/housekeeping?: Yes  Manage your money, pay your bills and track your expenses?: Yes  Make your own meals?: Yes    Do your own shopping?: Yes    Previous Hospitalizations:   Any hospitalizations or ED visits within the last 12 months?: No      Advance Care Planning:   Living will: No    Durable POA for healthcare: No    Advanced directive: No      PREVENTIVE SCREENINGS      Cardiovascular Screening:    General: Screening Current      Diabetes Screening:     General: Screening Current      Breast Cancer Screening:     General: Screening Current      Cervical Cancer Screening:    General: Screening Not Indicated      Osteoporosis Screening:    General: Screening Current      Lung Cancer Screening:     General: Screening Not Indicated      Hepatitis C Screening:    General: Screening Current    Screening, Brief Intervention, and Referral to Treatment (SBIRT)    Screening  Typical number of drinks in a day: 0  Typical number of drinks in a week: 0  Interpretation: Low risk drinking behavior.     AUDIT-C Screenin) How often did you have a drink containing alcohol in the past year? never  2) How many drinks did you have on a typical day when you were drinking in the past year? 0  3) How often did you have 6 or more drinks on one occasion in the past year? never    AUDIT-C Score: 0  Interpretation: Score 0-2 (female): Negative screen for alcohol misuse    Single Item Drug Screening:  How often have you used an illegal drug (including marijuana) or a prescription medication for non-medical reasons in the past year? never    Single Item Drug Screen Score: 0  Interpretation: Negative screen for possible drug use disorder    No results found. Physical Exam:     /84 (BP Location: Left arm, Patient Position: Sitting, Cuff Size: Large)   Pulse 82   Resp 16   Ht 5' 1" (1.549 m)   Wt 85.1 kg (187 lb 9.8 oz)   SpO2 98%   BMI 35.45 kg/m²     Physical Exam  Constitutional:       General: She is not in acute distress. Appearance: Normal appearance. HENT:      Nose: Nose normal.   Eyes:      Extraocular Movements: Extraocular movements intact. Conjunctiva/sclera: Conjunctivae normal.   Pulmonary:      Effort: Pulmonary effort is normal. No respiratory distress. Breath sounds: No wheezing. Abdominal:      General: Abdomen is flat. Musculoskeletal:         General: Normal range of motion. Cervical back: Normal range of motion. Skin:     Findings: No erythema or rash. Neurological:      Mental Status: She is alert and oriented to person, place, and time.    Psychiatric:         Mood and Affect: Mood normal.         Behavior: Behavior normal.          ELI Smith

## 2023-12-07 ENCOUNTER — OFFICE VISIT (OUTPATIENT)
Age: 78
End: 2023-12-07
Payer: MEDICARE

## 2023-12-07 ENCOUNTER — APPOINTMENT (OUTPATIENT)
Age: 78
End: 2023-12-07
Payer: MEDICARE

## 2023-12-07 VITALS
WEIGHT: 185 LBS | BODY MASS INDEX: 34.93 KG/M2 | SYSTOLIC BLOOD PRESSURE: 123 MMHG | HEART RATE: 88 BPM | HEIGHT: 61 IN | DIASTOLIC BLOOD PRESSURE: 82 MMHG

## 2023-12-07 DIAGNOSIS — M25.511 CHRONIC RIGHT SHOULDER PAIN: ICD-10-CM

## 2023-12-07 DIAGNOSIS — G89.29 CHRONIC RIGHT SHOULDER PAIN: ICD-10-CM

## 2023-12-07 DIAGNOSIS — M75.81 ROTATOR CUFF TENDONITIS, RIGHT: Primary | ICD-10-CM

## 2023-12-07 PROCEDURE — 20610 DRAIN/INJ JOINT/BURSA W/O US: CPT

## 2023-12-07 PROCEDURE — 99213 OFFICE O/P EST LOW 20 MIN: CPT

## 2023-12-07 PROCEDURE — 73030 X-RAY EXAM OF SHOULDER: CPT

## 2023-12-07 RX ORDER — METHYLPREDNISOLONE ACETATE 40 MG/ML
2 INJECTION, SUSPENSION INTRA-ARTICULAR; INTRALESIONAL; INTRAMUSCULAR; SOFT TISSUE
Status: COMPLETED | OUTPATIENT
Start: 2023-12-07 | End: 2023-12-07

## 2023-12-07 RX ORDER — BUPIVACAINE HYDROCHLORIDE 2.5 MG/ML
6 INJECTION, SOLUTION INFILTRATION; PERINEURAL
Status: COMPLETED | OUTPATIENT
Start: 2023-12-07 | End: 2023-12-07

## 2023-12-07 RX ADMIN — METHYLPREDNISOLONE ACETATE 2 ML: 40 INJECTION, SUSPENSION INTRA-ARTICULAR; INTRALESIONAL; INTRAMUSCULAR; SOFT TISSUE at 09:30

## 2023-12-07 RX ADMIN — BUPIVACAINE HYDROCHLORIDE 6 ML: 2.5 INJECTION, SOLUTION INFILTRATION; PERINEURAL at 09:30

## 2023-12-07 NOTE — PROGRESS NOTES
CHIEF COMPLAIN/REASON FOR VISIT  No chief complaint on file. HISTORY OF PRESENT ILLNESS  Scarlett Shelton is a RHD 70 y.o. female who presents for evaluation of their right shoulder. Patient said she first started experiencing pain in the right shoulder around June of 2022 after she had her knee replaced. Patient said she tried PT initially but did not find this beneficial. She feels the pain is worse at night. She describes the pain on the lateral portion of the shoulder. She admits to medical history of fibromyalgia. REVIEW OF SYSTEMS  Review of systems was performed and, woutside that mentioned in the HPI, it was negative for symptomology related to the integumentary, hematologic, immunologic, allergic, neurologic, cardiovascular, respiratory, GI or  systems. MEDICAL HISTORY  Patient Active Problem List   Diagnosis    Attention deficit disorder (ADD) without hyperactivity    Chronic renal failure, stage 3a (HCC)    Depression    Fibromyalgia    Hiatal hernia with GERD    Hyperlipidemia    Obstructive sleep apnea of adult    Osteoarthritis (arthritis due to wear and tear of joints)    Pre-diabetes    PTSD (post-traumatic stress disorder)    Vitamin D deficiency    Obesity (BMI 30-39. 9)    Obesity, morbid ([address])    Chronic right shoulder pain    Eye twitch       SURGICAL HISTORY  Past Surgical History:   Procedure Laterality Date    BREAST SURGERY  06/1986    Bilat reduction    EYE SURGERY  cataracts Aug & Sep 2022    FRACTURE SURGERY  L wrist  Apr 2017    HIP SURGERY  both replaced, L revised    JOINT REPLACEMENT  TKR (R) 1999, 2012, 2022    Both hips    KNEE SURGERY  R 02/1975,    SPINE SURGERY  cervical 2005, lumbar 2012       CURRENT MEDICATIONS    Current Outpatient Medications:     atoMOXetine (STRATTERA) 40 mg capsule, , Disp: , Rfl:     buPROPion (WELLBUTRIN XL) 150 mg 24 hr tablet, , Disp: , Rfl:     cetirizine (ZyrTEC) 10 mg tablet, Take by mouth, Disp: , Rfl:     Cholecalciferol 50 MCG (2000 UT) CAPS, Take 1 capsule by mouth in the morning, Disp: , Rfl:     Cyanocobalamin (VITAMIN B-12 PO), Take by mouth, Disp: , Rfl:     DULoxetine (CYMBALTA) 60 mg delayed release capsule, , Disp: , Rfl:     meloxicam (MOBIC) 15 mg tablet, Take 1 tablet by mouth every morning, Disp: , Rfl:     nystatin (MYCOSTATIN) powder, Apply 1 Application topically daily, Disp: , Rfl:     pantoprazole (PROTONIX) 40 mg tablet, Take 40 mg by mouth daily, Disp: , Rfl:     pregabalin (Lyrica) 75 mg capsule, Take by mouth (Patient not taking: Reported on 11/30/2023), Disp: , Rfl:     SOCIAL HISTORY  Social History     Socioeconomic History    Marital status: Single     Spouse name: Not on file    Number of children: Not on file    Years of education: Not on file    Highest education level: Not on file   Occupational History    Not on file   Tobacco Use    Smoking status: Never    Smokeless tobacco: Never   Vaping Use    Vaping Use: Never used   Substance and Sexual Activity    Alcohol use: Not Currently     Comment: maybe one during holidays    Drug use: Never    Sexual activity: Not Currently     Partners: Male     Birth control/protection: Abstinence, Condom Male   Other Topics Concern    Not on file   Social History Narrative    Not on file     Social Determinants of Health     Financial Resource Strain: Low Risk  (11/24/2023)    Overall Financial Resource Strain (CARDIA)     Difficulty of Paying Living Expenses: Not hard at all   Food Insecurity: Not on file   Transportation Needs: No Transportation Needs (11/24/2023)    PRAPARE - Transportation     Lack of Transportation (Medical): No     Lack of Transportation (Non-Medical): No   Physical Activity: Not on file   Stress: Not on file   Social Connections: Not on file   Intimate Partner Violence: Not on file   Housing Stability: Not on file       Objective     VITAL SIGNS  There were no vitals taken for this visit.      PHYSICAL EXAM  General:   Well-appearing  No acute distress  Appears stated age    Right Shoulder  Negative tenderness to palpation over the acromioclavicular joint  Negative tenderness to palpation over the long head of the biceps tendon  Shoulder effusion none present  Shoulder abduction 140 / 160  Shoulder forward flexion 140 / 160  Shoulder internal rotation back pocket / T12  Supraspinatus/ABD 5/5   Infraspinatus/ER 5/5   Subscapularis 5/5  Negative Belly Press/SS  Positive Neer  Positive Huston  Positive O'briens  Skin is intact with no erythema, warmth or drainage  5/5 strength in the deltoid, biceps, triceps, wrist extension, wrist flexion, interossei, OP, EPL  Sensation to light touch is normal in the axillary, radial, ulnar, and median nerve distributions. Radial pulse is 2+  There is no axillary lymphadenopathy    RADIOGRAPHIC EXAMINATION/DIAGNOSTICS:  Right shoulder x-rays show moderate osteoarthritis in the glenohumeral and AC joints    ASSESSMENT/PLAN:  Right shoulder pain, rotator cuff tendonitis    Today, we discussed conservative treatment options including but are certainly not limited to: Rest, ice, compression, elevation, activity modification, anti-inflammatory medication, physical therapy, and/or injections. We discussed benefits of each potential treatment option. After discussion, patient was agreeable to trying Rest, Ice, Compression, Elevation, Activity Modification, Anti-inflammatory Medication, and Corticosteroid Injection. Discussion had about benefits and risks of corticosteroid injection. She declined PT script. We will plan to follow up with the patient as needed. They are understanding that if the pain should worsen or they develop new symptoms to please reach out to us sooner. Patient is understanding and agreeable to this plan. Large joint arthrocentesis: R subacromial bursa  Universal Protocol:  Consent: Verbal consent obtained.   Risks and benefits: risks, benefits and alternatives were discussed  Consent given by: patient  Patient understanding: patient states understanding of the procedure being performed  Patient consent: the patient's understanding of the procedure matches consent given  Site marked: the operative site was marked  Patient identity confirmed: verbally with patient  Supporting Documentation  Indications: pain   Procedure Details  Location: shoulder - R subacromial bursa  Needle size: 22 G  Ultrasound guidance: no  Approach: posterior  Medications administered: 6 mL bupivacaine 0.25 %; 2 mL methylPREDNISolone acetate 40 mg/mL    Patient tolerance: patient tolerated the procedure well with no immediate complications

## 2024-04-18 ENCOUNTER — OFFICE VISIT (OUTPATIENT)
Age: 79
End: 2024-04-18
Payer: MEDICARE

## 2024-04-18 VITALS
BODY MASS INDEX: 38.33 KG/M2 | HEIGHT: 61 IN | DIASTOLIC BLOOD PRESSURE: 82 MMHG | WEIGHT: 203 LBS | SYSTOLIC BLOOD PRESSURE: 137 MMHG | HEART RATE: 74 BPM

## 2024-04-18 DIAGNOSIS — M75.81 ROTATOR CUFF TENDONITIS, RIGHT: Primary | ICD-10-CM

## 2024-04-18 PROCEDURE — 20610 DRAIN/INJ JOINT/BURSA W/O US: CPT | Performed by: ORTHOPAEDIC SURGERY

## 2024-04-18 PROCEDURE — 99214 OFFICE O/P EST MOD 30 MIN: CPT | Performed by: ORTHOPAEDIC SURGERY

## 2024-04-18 RX ORDER — METHYLPREDNISOLONE ACETATE 80 MG/ML
1 INJECTION, SUSPENSION INTRA-ARTICULAR; INTRALESIONAL; INTRAMUSCULAR; SOFT TISSUE
Status: COMPLETED | OUTPATIENT
Start: 2024-04-18 | End: 2024-04-18

## 2024-04-18 RX ORDER — BUPIVACAINE HYDROCHLORIDE 2.5 MG/ML
6 INJECTION, SOLUTION INFILTRATION; PERINEURAL
Status: COMPLETED | OUTPATIENT
Start: 2024-04-18 | End: 2024-04-18

## 2024-04-18 RX ADMIN — BUPIVACAINE HYDROCHLORIDE 6 ML: 2.5 INJECTION, SOLUTION INFILTRATION; PERINEURAL at 09:45

## 2024-04-18 RX ADMIN — METHYLPREDNISOLONE ACETATE 1 ML: 80 INJECTION, SUSPENSION INTRA-ARTICULAR; INTRALESIONAL; INTRAMUSCULAR; SOFT TISSUE at 09:45

## 2024-04-18 NOTE — PROGRESS NOTES
REASON FOR FOLLOW-UP  Gina Andersen is a 78 y.o. female who presents for follow-up of the right rotator cuff tendonitis.    HISTORY OF PRESENT ILLNESS  Following our last visit, we decided to initially treat her right rotator cuff tendonitis non-operatively. Our plan was to manage her symptoms conservatively with RICE, PT, and corticosteroid injection. Patient said she was doing well after the corticosteroid injection which provided good relief of her symptoms. She was also working with PT which went well. She said about a month ago she started developing shoulder pain again which she feels is different compared to before.    PHYSICAL EXAM  General:   Well-appearing  No acute distress  Appears stated age    right Shoulder  Shoulder effusion none present  Shoulder abduction 120 / 140  Shoulder forward flexion 120 / 140  Shoulder internal rotation back pocket  Supraspinatus/ABD 5/5   Infraspinatus/ER 5/5   Negative Belly Press/SS  Negative O'briens  Skin is intact with no erythema, warmth or drainage  Motor strength intact distally  Sensation to light touch is normal in the axillary, radial, ulnar, and median nerve distributions.  Fingers warm and perfused    DIAGNOSTIC IMAGING:  No new imaging today    IMPRESSION/REPORT/PLAN  Right rotator cuff tendonitis    Today, we discussed conservative treatment options including but are certainly not limited to: Rest, ice, compression, elevation, activity modification, anti-inflammatory medication, physical therapy, and/or injections.  We discussed benefits of each potential treatment option.  After discussion, patient was agreeable to trying Rest, Ice, Compression, Elevation, Activity Modification, Anti-inflammatory Medication, Physical Therapy, and Corticosteroid Injection.  We will plan to follow up with the patient as needed.  They are understanding that if the pain should worsen or they develop new symptoms to please reach out to us sooner. Patient is understanding  and agreeable to this plan.     Large joint arthrocentesis  Universal Protocol:  Consent: Verbal consent obtained.  Risks and benefits: risks, benefits and alternatives were discussed  Consent given by: patient  Patient understanding: patient states understanding of the procedure being performed  Patient consent: the patient's understanding of the procedure matches consent given  Site marked: the operative site was marked  Patient identity confirmed: verbally with patient  Supporting Documentation  Indications: pain   Procedure Details  Needle size: 22 G  Ultrasound guidance: no  Approach: posterior  Medications administered: 6 mL bupivacaine 0.25 %; 1 mL methylPREDNISolone acetate 80 mg/mL    Patient tolerance: patient tolerated the procedure well with no immediate complications  Dressing:  Sterile dressing applied          Scribe Attestation      I,:  Ken Ochoa PA-C am acting as a scribe while in the presence of the attending physician.:       I,:  Brian Andrade MD personally performed the services described in this documentation    as scribed in my presence.:

## 2024-05-29 ENCOUNTER — APPOINTMENT (OUTPATIENT)
Age: 79
End: 2024-05-29
Payer: MEDICARE

## 2024-05-29 DIAGNOSIS — R73.03 PRE-DIABETES: ICD-10-CM

## 2024-05-29 DIAGNOSIS — Z13.0 SCREENING FOR DEFICIENCY ANEMIA: ICD-10-CM

## 2024-05-29 DIAGNOSIS — E78.5 HYPERLIPIDEMIA, UNSPECIFIED HYPERLIPIDEMIA TYPE: ICD-10-CM

## 2024-05-29 DIAGNOSIS — N18.31 CHRONIC RENAL FAILURE, STAGE 3A (HCC): ICD-10-CM

## 2024-05-29 LAB
ALBUMIN SERPL BCP-MCNC: 4.2 G/DL (ref 3.5–5)
ALP SERPL-CCNC: 109 U/L (ref 34–104)
ALT SERPL W P-5'-P-CCNC: 12 U/L (ref 7–52)
ANION GAP SERPL CALCULATED.3IONS-SCNC: 8 MMOL/L (ref 4–13)
AST SERPL W P-5'-P-CCNC: 17 U/L (ref 13–39)
BASOPHILS # BLD AUTO: 0.06 THOUSANDS/ÂΜL (ref 0–0.1)
BASOPHILS NFR BLD AUTO: 1 % (ref 0–1)
BILIRUB SERPL-MCNC: 0.43 MG/DL (ref 0.2–1)
BUN SERPL-MCNC: 28 MG/DL (ref 5–25)
CALCIUM SERPL-MCNC: 9 MG/DL (ref 8.4–10.2)
CHLORIDE SERPL-SCNC: 103 MMOL/L (ref 96–108)
CHOLEST SERPL-MCNC: 310 MG/DL
CO2 SERPL-SCNC: 31 MMOL/L (ref 21–32)
CREAT SERPL-MCNC: 1.07 MG/DL (ref 0.6–1.3)
EOSINOPHIL # BLD AUTO: 0.21 THOUSAND/ÂΜL (ref 0–0.61)
EOSINOPHIL NFR BLD AUTO: 3 % (ref 0–6)
ERYTHROCYTE [DISTWIDTH] IN BLOOD BY AUTOMATED COUNT: 12.2 % (ref 11.6–15.1)
EST. AVERAGE GLUCOSE BLD GHB EST-MCNC: 126 MG/DL
GFR SERPL CREATININE-BSD FRML MDRD: 50 ML/MIN/1.73SQ M
GLUCOSE P FAST SERPL-MCNC: 113 MG/DL (ref 65–99)
HBA1C MFR BLD: 6 %
HCT VFR BLD AUTO: 46.2 % (ref 34.8–46.1)
HDLC SERPL-MCNC: 80 MG/DL
HGB BLD-MCNC: 14.8 G/DL (ref 11.5–15.4)
IMM GRANULOCYTES # BLD AUTO: 0.03 THOUSAND/UL (ref 0–0.2)
IMM GRANULOCYTES NFR BLD AUTO: 1 % (ref 0–2)
LDLC SERPL CALC-MCNC: 199 MG/DL (ref 0–100)
LYMPHOCYTES # BLD AUTO: 1.99 THOUSANDS/ÂΜL (ref 0.6–4.47)
LYMPHOCYTES NFR BLD AUTO: 31 % (ref 14–44)
MCH RBC QN AUTO: 30.8 PG (ref 26.8–34.3)
MCHC RBC AUTO-ENTMCNC: 32 G/DL (ref 31.4–37.4)
MCV RBC AUTO: 96 FL (ref 82–98)
MONOCYTES # BLD AUTO: 0.52 THOUSAND/ÂΜL (ref 0.17–1.22)
MONOCYTES NFR BLD AUTO: 8 % (ref 4–12)
NEUTROPHILS # BLD AUTO: 3.54 THOUSANDS/ÂΜL (ref 1.85–7.62)
NEUTS SEG NFR BLD AUTO: 56 % (ref 43–75)
NRBC BLD AUTO-RTO: 0 /100 WBCS
PLATELET # BLD AUTO: 276 THOUSANDS/UL (ref 149–390)
PMV BLD AUTO: 9.9 FL (ref 8.9–12.7)
POTASSIUM SERPL-SCNC: 4.6 MMOL/L (ref 3.5–5.3)
PROT SERPL-MCNC: 7.1 G/DL (ref 6.4–8.4)
RBC # BLD AUTO: 4.81 MILLION/UL (ref 3.81–5.12)
SODIUM SERPL-SCNC: 142 MMOL/L (ref 135–147)
TRIGL SERPL-MCNC: 154 MG/DL
WBC # BLD AUTO: 6.35 THOUSAND/UL (ref 4.31–10.16)

## 2024-05-29 PROCEDURE — 83036 HEMOGLOBIN GLYCOSYLATED A1C: CPT

## 2024-05-29 PROCEDURE — 80061 LIPID PANEL: CPT

## 2024-05-29 PROCEDURE — 80053 COMPREHEN METABOLIC PANEL: CPT

## 2024-05-29 PROCEDURE — 36415 COLL VENOUS BLD VENIPUNCTURE: CPT

## 2024-05-29 PROCEDURE — 85025 COMPLETE CBC W/AUTO DIFF WBC: CPT

## 2024-06-05 ENCOUNTER — OFFICE VISIT (OUTPATIENT)
Age: 79
End: 2024-06-05
Payer: MEDICARE

## 2024-06-05 VITALS
BODY MASS INDEX: 38.09 KG/M2 | OXYGEN SATURATION: 99 % | HEIGHT: 61 IN | HEART RATE: 94 BPM | DIASTOLIC BLOOD PRESSURE: 74 MMHG | WEIGHT: 201.72 LBS | SYSTOLIC BLOOD PRESSURE: 122 MMHG | TEMPERATURE: 97.6 F

## 2024-06-05 DIAGNOSIS — M19.90 OSTEOARTHRITIS, UNSPECIFIED OSTEOARTHRITIS TYPE, UNSPECIFIED SITE: ICD-10-CM

## 2024-06-05 DIAGNOSIS — R05.3 CHRONIC COUGH: ICD-10-CM

## 2024-06-05 DIAGNOSIS — M25.511 CHRONIC RIGHT SHOULDER PAIN: ICD-10-CM

## 2024-06-05 DIAGNOSIS — F32.A DEPRESSIVE DISORDER: ICD-10-CM

## 2024-06-05 DIAGNOSIS — R73.03 PRE-DIABETES: ICD-10-CM

## 2024-06-05 DIAGNOSIS — N18.31 CHRONIC RENAL FAILURE, STAGE 3A (HCC): ICD-10-CM

## 2024-06-05 DIAGNOSIS — R41.3 MEMORY CHANGES: ICD-10-CM

## 2024-06-05 DIAGNOSIS — G89.29 CHRONIC RIGHT SHOULDER PAIN: ICD-10-CM

## 2024-06-05 DIAGNOSIS — E78.5 HYPERLIPIDEMIA, UNSPECIFIED HYPERLIPIDEMIA TYPE: Primary | ICD-10-CM

## 2024-06-05 PROBLEM — E66.01 OBESITY, MORBID (HCC): Status: RESOLVED | Noted: 2023-11-30 | Resolved: 2024-06-05

## 2024-06-05 PROCEDURE — G2211 COMPLEX E/M VISIT ADD ON: HCPCS | Performed by: NURSE PRACTITIONER

## 2024-06-05 PROCEDURE — 99214 OFFICE O/P EST MOD 30 MIN: CPT | Performed by: NURSE PRACTITIONER

## 2024-06-05 RX ORDER — BUPROPION HYDROCHLORIDE 150 MG/1
150 TABLET ORAL EVERY MORNING
Qty: 90 TABLET | Refills: 3 | Status: SHIPPED | OUTPATIENT
Start: 2024-06-05

## 2024-06-05 RX ORDER — EZETIMIBE 10 MG/1
10 TABLET ORAL DAILY
Qty: 30 TABLET | Refills: 5 | Status: CANCELLED | OUTPATIENT
Start: 2024-06-05 | End: 2024-12-02

## 2024-06-05 RX ORDER — FLUTICASONE PROPIONATE 50 MCG
1 SPRAY, SUSPENSION (ML) NASAL
Qty: 9.9 ML | Refills: 3 | Status: SHIPPED | OUTPATIENT
Start: 2024-06-05

## 2024-06-05 NOTE — ASSESSMENT & PLAN NOTE
"Reported to b/l knees, hands, and shoulders  Recently saw ortho for R shoulder pain, \"the injection did not help one bit\"  Gait and ADLs greatly impacted per patient. \"No one is listening to me or helping me\"  Was doing PT in the past, \"it helped but I ran out\"  Requesting a referral to rheumatology   "

## 2024-06-05 NOTE — ASSESSMENT & PLAN NOTE
Secondary to long-term NSAID use  GFR stable  Discussed importance of blood pressure and blood sugar control  Will repeat in 6 to 12 months      Lab Results   Component Value Date    EGFR 50 05/29/2024    EGFR 50 09/01/2023    EGFR 55 (A) 08/27/2022    CREATININE 1.07 05/29/2024    CREATININE 1.07 09/01/2023    CREATININE 1.07 08/27/2022

## 2024-06-05 NOTE — ASSESSMENT & PLAN NOTE
Unresolved per patient report, recently received cortisone injection through orthopedics  Will refer to rheumatology per patient request

## 2024-06-05 NOTE — PROGRESS NOTES
"Ambulatory Visit  Name: Gina Andersen      : 1945      MRN: [id number]  Encounter Provider: ELI Segura  Encounter Date: 2024   Encounter department: [facility]    Assessment & Plan   1. Hyperlipidemia, unspecified hyperlipidemia type  Assessment & Plan:  Patient intolerant to statins  Does not wish to start ezetimibe today  Would likely benefit from PSK 9 inhibitors, referral placed to lipid clinic to discuss further  Orders:  -     Ambulatory Referral to Cardiology; Future  2. Osteoarthritis, unspecified osteoarthritis type, unspecified site  Assessment & Plan:  Reported to b/l knees, hands, and shoulders  Recently saw ortho for R shoulder pain, \"the injection did not help one bit\"  Gait and ADLs greatly impacted per patient. \"No one is listening to me or helping me\"  Was doing PT in the past, \"it helped but I ran out\"  Requesting a referral to rheumatology   Orders:  -     Ambulatory Referral to Rheumatology; Future  3. Memory changes  Assessment & Plan:  Patient wishes to start OTC Prevagen prior to any formal workup.  Patient attributes her memory difficulties to her mental health.  Will reassess at 6-month follow-up  4. Pre-diabetes  Assessment & Plan:  A1c stable and unchanged, at 6.0%  Recommended weight loss and low-carb diet  5. Chronic renal failure, stage 3a (HCC)  Assessment & Plan:  Secondary to long-term NSAID use  GFR stable  Discussed importance of blood pressure and blood sugar control  Will repeat in 6 to 12 months      Lab Results   Component Value Date    EGFR 50 2024    EGFR 50 2023    EGFR 55 (A) 2022    CREATININE 1.07 2024    CREATININE 1.07 2023    CREATININE 1.07 2022     6. Chronic right shoulder pain  Assessment & Plan:  Unresolved per patient report, recently received cortisone injection through orthopedics  Will refer to rheumatology per patient request  7. Depressive disorder  Assessment & " "Plan:  Wellbutrin refilled per patient request  Orders:  -     buPROPion (WELLBUTRIN XL) 150 mg 24 hr tablet; Take 1 tablet (150 mg total) by mouth every morning  8. Chronic cough  Assessment & Plan:  Patient with chronic cough \"for several months\"  Patient with a history of seasonal allergies, has not been consistent with taking cetirizine  Will have patient resume cetirizine and start fluticasone nasal spray to rule out postnasal drip as contributing factor  Patient reports her heartburn/GERD is currently well-controlled  Denies a history of asthma  If cough fails to improve, will consider chest x-ray  Orders:  -     fluticasone (FLONASE) 50 mcg/act nasal spray; 1 spray into each nostril daily at bedtime       History of Present Illness   {Disappearing Hyperlinks I Encounters * My Last Note * Since Last Visit * History :37288}  Routine follow-up to discuss recent lab work and reassess chronic conditions  Primary concern today: cristy Andersen is a 78 y.o. female here for evaluation of memory problems. She is accompanied by ALONE. Primary caregiver is patient. Patient lives alone. The family and the patient identify problems with changes in short term memory, ongoing for many years. She is not concerned about medication errors, wandering, driving, cooking, and inability to maintain adequate nutrition. Medication administration: patient self medicates. \"Can't remember tasks and misplace things frequently... maybe related to my PTSD.\" Memory \"no better or worse over the past few years\".     [facility]\"  - Used to follow with [facility] neurosurgery       Review of Systems   Constitutional: Negative.    HENT: Negative.     Eyes: Negative.    Respiratory: Negative.     Cardiovascular: Negative.    Gastrointestinal: Negative.    Endocrine: Negative.    Genitourinary: Negative.    Musculoskeletal: Negative.    Skin: Negative.    Allergic/Immunologic: Negative.  " "  Neurological: Negative.    Hematological: Negative.    Psychiatric/Behavioral: Negative.         Objective   {Disappearing Hyperlinks   Review Vitals * Enter New Vitals * Results Review * Labs * Imaging * Cardiology * Procedures * Lung Cancer Screening :50775}  /74 (BP Location: Left arm, Patient Position: Sitting, Cuff Size: Large)   Pulse 94   Temp 97.6 °F (36.4 °C) (Temporal)   Ht 5' 1\" (1.549 m)   Wt 91.5 kg (201 lb 11.5 oz)   SpO2 99%   BMI 38.11 kg/m²     Physical Exam  Constitutional:       General: She is not in acute distress.     Appearance: Normal appearance. She is obese.   HENT:      Nose: Nose normal.   Eyes:      Extraocular Movements: Extraocular movements intact.      Conjunctiva/sclera: Conjunctivae normal.   Cardiovascular:      Rate and Rhythm: Normal rate and regular rhythm.      Heart sounds: Normal heart sounds.   Pulmonary:      Effort: Pulmonary effort is normal. No respiratory distress.      Breath sounds: Normal breath sounds. No wheezing.   Abdominal:      General: Abdomen is flat.   Musculoskeletal:         General: Normal range of motion.      Cervical back: Normal range of motion.      Right lower leg: No edema.      Left lower leg: No edema.   Skin:     Findings: No erythema or rash.   Neurological:      Mental Status: She is alert and oriented to person, place, and time.   Psychiatric:         Mood and Affect: Mood normal.         Behavior: Behavior normal.     Administrative Statements {Disappearing Hyperlinks I  [facility]    Depression Screening Follow-up Plan: Patient's depression screening was positive with a PHQ-2 score of . Their PHQ-9 score was 13. Patient declines further evaluation by mental health professional and/or medications. They have no active suicidal ideations. Brief counseling provided and recommend additional follow-up/re-evaluation at next office visit.  "

## 2024-06-05 NOTE — ASSESSMENT & PLAN NOTE
Patient wishes to start OTC Prevagen prior to any formal workup.  Patient attributes her memory difficulties to her mental health.  Will reassess at 6-month follow-up

## 2024-06-05 NOTE — ASSESSMENT & PLAN NOTE
Patient intolerant to statins  Does not wish to start ezetimibe today  Would likely benefit from PSK 9 inhibitors, referral placed to lipid clinic to discuss further

## 2024-06-05 NOTE — ASSESSMENT & PLAN NOTE
"Patient with chronic cough \"for several months\"  Patient with a history of seasonal allergies, has not been consistent with taking cetirizine  Will have patient resume cetirizine and start fluticasone nasal spray to rule out postnasal drip as contributing factor  Patient reports her heartburn/GERD is currently well-controlled  Denies a history of asthma  If cough fails to improve, will consider chest x-ray  "

## 2024-07-03 DIAGNOSIS — M79.7 FIBROMYALGIA: Primary | ICD-10-CM

## 2024-07-03 RX ORDER — DULOXETIN HYDROCHLORIDE 60 MG/1
60 CAPSULE, DELAYED RELEASE ORAL DAILY
Qty: 30 CAPSULE | Refills: 0 | Status: SHIPPED | OUTPATIENT
Start: 2024-07-03

## 2024-07-19 DIAGNOSIS — M79.7 FIBROMYALGIA: ICD-10-CM

## 2024-07-19 RX ORDER — DULOXETIN HYDROCHLORIDE 60 MG/1
60 CAPSULE, DELAYED RELEASE ORAL DAILY
Qty: 90 CAPSULE | Refills: 0 | Status: SHIPPED | OUTPATIENT
Start: 2024-07-19

## 2024-07-19 NOTE — TELEPHONE ENCOUNTER
Requested medication(s) are due for refill today: Yes  Patient has already received a courtesy refill: No  Other reason request has been forwarded to provider:       Psychiatry: Antidepressants - SNRI [id number]/19/2024 02:57 PM   Protocol Details Valid encounter within last 6 months    Last BP in normal range and within 180 days      To be filled at: [facility] [place] - [address]           07/19/2024 - Rx Request: You and Boone Frederick  (Newest Message First)View All Conversations on this Encounter  July 19, 2024  Boone Frederick   to [facility]   DY    7/19/24  3:01 PM  Refill must be reviewed and completed by the office or provider. The refill is unable to be approved or denied by the medication management team.    Please review if your office will be taking over this prescription.

## 2024-08-09 ENCOUNTER — OFFICE VISIT (OUTPATIENT)
Dept: CARDIOLOGY CLINIC | Facility: CLINIC | Age: 79
End: 2024-08-09
Payer: MEDICARE

## 2024-08-09 VITALS
SYSTOLIC BLOOD PRESSURE: 132 MMHG | WEIGHT: 205 LBS | HEIGHT: 61 IN | HEART RATE: 100 BPM | BODY MASS INDEX: 38.71 KG/M2 | DIASTOLIC BLOOD PRESSURE: 74 MMHG

## 2024-08-09 DIAGNOSIS — E78.5 HYPERLIPIDEMIA, UNSPECIFIED HYPERLIPIDEMIA TYPE: ICD-10-CM

## 2024-08-09 DIAGNOSIS — E78.5 DYSLIPIDEMIA: Primary | ICD-10-CM

## 2024-08-09 DIAGNOSIS — M79.7 FIBROMYALGIA: ICD-10-CM

## 2024-08-09 PROCEDURE — 99204 OFFICE O/P NEW MOD 45 MIN: CPT | Performed by: INTERNAL MEDICINE

## 2024-08-09 RX ORDER — EZETIMIBE 10 MG/1
10 TABLET ORAL DAILY
Qty: 90 TABLET | Refills: 3 | Status: SHIPPED | OUTPATIENT
Start: 2024-08-09

## 2024-08-09 NOTE — PROGRESS NOTES
"      Cardiology             Gina Andersen  1945  [id number]              Assessment/Plan:    Dyslipidemia, uncontrolled  Prediabetes  CKD      Uncontrolled dyslipidemia with LDL cholesterol as high as 199 mg/dL 5/29/2024.  She has been intolerant to atorvastatin and simvastatin and is quite reluctant to try another statin medication unless absolutely necessary.  We will start with ezetimibe 10 mg daily to see what kind of effect she has.  If suboptimal, we may consider either bempedoic acid or Leqvio.  Will plan to repeat lipid panel before next visit in 4 months.      Follow up in 4 months after repeat lipid panel        Interval History:     This is a 78-year-old female with no prior cardiac history.  She has severe osteoarthritis, longstanding dyslipidemia, CKD, and prediabetes.    She has been referred to lipid clinic for uncontrolled dyslipidemia.  LDL cholesterol on 5/29/2024 was significant elevated at 199 mg/dL with level prior to that in 2023 of 177 mg/dL.  She has been reported to have statin intolerance, and states has had tried Lipitor and simvastatin in the past resulting in severe arthralgias.    She attempts to eat a healthy diet, not exercising regularly.    From a symptomatic standpoint she feels well without exertional chest pain, shortness of breath, dizziness, palpitations, lower extremity edema.  Prior hemoglobin A1c was 6.0.                     Vitals:  Vitals:    08/09/24 1500   BP: 132/74   Pulse: 100   Weight: 93 kg (205 lb)   Height: 5' 1\" (1.549 m)         Past Medical History:   Diagnosis Date   • Arthritis 1985   • Chronic kidney disease 03/2023   • Depression 1986   • Dysphagia, unspecified 01/31/2022   • GERD (gastroesophageal reflux disease)     Don't remember   • Headache(784.0) 1986    Migraine   • Hyperlipidemia    • Memory loss    • Visual impairment      Social History     Socioeconomic History   • Marital status: Single     Spouse name: Not on file   • Number of " children: Not on file   • Years of education: Not on file   • Highest education level: Not on file   Occupational History   • Not on file   Tobacco Use   • Smoking status: Never   • Smokeless tobacco: Never   Vaping Use   • Vaping status: Never Used   Substance and Sexual Activity   • Alcohol use: Not Currently     Comment: maybe one during holidays   • Drug use: Never   • Sexual activity: Not Currently     Partners: Male     Birth control/protection: Abstinence, Condom Male   Other Topics Concern   • Not on file   Social History Narrative   • Not on file     Social Determinants of Health     Financial Resource Strain: Low Risk  (2023)    Overall Financial Resource Strain (CARDIA)    • Difficulty of Paying Living Expenses: Not hard at all   Food Insecurity: Not on file   Transportation Needs: No Transportation Needs (2023)    PRAPARE - Transportation    • Lack of Transportation (Medical): No    • Lack of Transportation (Non-Medical): No   Physical Activity: Not on file   Stress: Not on file   Social Connections: Not on file   Intimate Partner Violence: Not on file   Housing Stability: Not on file      Family History   Problem Relation Age of Onset   • Coronary artery disease Mother          Mar 1989   • Diabetes Mother         Type 2   • Arthritis Father    • Vision loss Father         Macular Degeneration   • Coronary artery disease Brother          2020   • Diabetes Brother         Type 2   • Diabetes Brother         Type 2   • Cancer Brother         Leukemia       Past Surgical History:   Procedure Laterality Date   • BREAST SURGERY  1986    Bilat reduction   • EYE SURGERY  cataracts Aug & Sep 2022   • FRACTURE SURGERY  L wrist  2017   • HIP SURGERY  both replaced, L revised   • JOINT REPLACEMENT  TKR (R) , ,     Both hips   • KNEE SURGERY  R 1975,   • SPINE SURGERY  cervical , lumbar        Current Outpatient Medications:   •  atoMOXetine (STRATTERA)  40 mg capsule, Take 40 mg by mouth daily, Disp: , Rfl:   •  buPROPion (WELLBUTRIN XL) 150 mg 24 hr tablet, Take 1 tablet (150 mg total) by mouth every morning, Disp: 90 tablet, Rfl: 3  •  Cholecalciferol 50 MCG (2000 UT) CAPS, Take 1 capsule by mouth in the morning, Disp: , Rfl:   •  Cyanocobalamin (VITAMIN B-12 PO), Take by mouth, Disp: , Rfl:   •  DULoxetine (CYMBALTA) 60 mg delayed release capsule, Take 1 capsule (60 mg total) by mouth daily, Disp: 90 capsule, Rfl: 0  •  ezetimibe (ZETIA) 10 mg tablet, Take 1 tablet (10 mg total) by mouth daily, Disp: 90 tablet, Rfl: 3  •  fluticasone (FLONASE) 50 mcg/act nasal spray, 1 spray into each nostril daily at bedtime, Disp: 9.9 mL, Rfl: 3  •  meloxicam (MOBIC) 15 mg tablet, Take 1 tablet by mouth every morning, Disp: , Rfl:   •  pantoprazole (PROTONIX) 40 mg tablet, Take 40 mg by mouth daily, Disp: , Rfl:   •  nystatin (MYCOSTATIN) powder, Apply 1 Application topically daily, Disp: , Rfl:         Review of Systems:  Review of Systems      Physical Exam:  Physical Exam    This note was completed in part utilizing [employer] Software.  Grammatical errors, random word insertions, spelling mistakes, and incomplete sentences can be an occasional consequence of this system secondary to software limitations, ambient noise, and hardware issues.  If you have any questions or concerns about the content, text, or information contained within the body of this dictation, please contact the provider for clarification.

## 2024-08-09 NOTE — TELEPHONE ENCOUNTER
Reason for call:   [x] Refill   [] Prior Auth  [] Other:     Office:   [x] PCP/Provider -   [] Specialty/Provider -     Medication: DULoxetine (CYMBALTA) 60 mg delayed release capsule Take 1 capsule (60 mg total) by mouth daily,       Pharmacy: [facility] - [address]      Does the patient have enough for 3 days?   [x] Yes   [] No - Send as HP to POD

## 2024-08-11 RX ORDER — DULOXETIN HYDROCHLORIDE 60 MG/1
60 CAPSULE, DELAYED RELEASE ORAL DAILY
Qty: 90 CAPSULE | Refills: 1 | Status: SHIPPED | OUTPATIENT
Start: 2024-08-11

## 2024-10-11 ENCOUNTER — TELEPHONE (OUTPATIENT)
Dept: FAMILY MEDICINE CLINIC | Facility: CLINIC | Age: 79
End: 2024-10-11

## 2024-10-31 ENCOUNTER — CONSULT (OUTPATIENT)
Dept: RHEUMATOLOGY | Facility: CLINIC | Age: 79
End: 2024-10-31
Payer: MEDICARE

## 2024-10-31 VITALS
OXYGEN SATURATION: 94 % | BODY MASS INDEX: 40.02 KG/M2 | HEIGHT: 61 IN | SYSTOLIC BLOOD PRESSURE: 122 MMHG | WEIGHT: 212 LBS | DIASTOLIC BLOOD PRESSURE: 70 MMHG | HEART RATE: 104 BPM

## 2024-10-31 DIAGNOSIS — M19.90 OSTEOARTHRITIS, UNSPECIFIED OSTEOARTHRITIS TYPE, UNSPECIFIED SITE: ICD-10-CM

## 2024-10-31 PROCEDURE — 99204 OFFICE O/P NEW MOD 45 MIN: CPT | Performed by: INTERNAL MEDICINE

## 2024-10-31 NOTE — PROGRESS NOTES
This is a Rheumatology Consult seen at the request of Jaron BENITEZ      HPI: Gina Andersen is a 78 y/o female who presents for further evaluation chronic pain, fibromyalgia    She was told in the past she has fibromyalgia and has tried Gabapentin with severe adverse reaction    [facility]  [facility] and tried, MTX, Arava and HCQ and did not help. She was then told she has osteoarthritis.    Chronic arthralgias hips (s/p b/l hip replacement) and right knee replacement X 3     Also C spine and lumbar surgery, chronic neck and back pain, DDD, radiculopathy    Was seen per Ortho and had right shoulder injection with mild relief      --------------------------------------------------------------------------------------------------------        ROS:      - for: Fevers, Chills or sweats.  No HAs or scalp tenderness.  No jaw claudication.  No acute visual or eye changes.  No dry eyes.  No auditory complaints.  No oral lesions or ulcers.  No dry mouth.  No sore throat or cough.  No chest pains or palpitations.  No shortness of breath, dyspnea on exertion or wheezing.  No hemotpysis.  No abdominal pain, GERD symptoms, diarrhea or constipation.  No urinary complaints.  No numbness, tingling or weakness.  No rashes.    All other ROS was reviewed and negative except as above         --------------------------------------------------------------------------------------------------------    Past Medical History    Past Medical History:   Diagnosis Date    Arthritis 1985    Chronic kidney disease 03/2023    Depression 1986    Dysphagia, unspecified 01/31/2022    GERD (gastroesophageal reflux disease)     Don't remember    Headache(784.0) 1986    Migraine    Hyperlipidemia     Memory loss     Visual impairment            Past Surgical History    Past Surgical History:   Procedure Laterality Date    BREAST SURGERY  06/1986    Bilat reduction    EYE SURGERY  cataracts Aug & Sep 2022    FRACTURE SURGERY  L wrist  " 2017    HIP SURGERY  both replaced, L revised    JOINT REPLACEMENT  TKR (R) , ,     Both hips    KNEE SURGERY  R 1975,    SPINE SURGERY  cervical 2005, lumbar            Family History    Family History   Problem Relation Age of Onset    Coronary artery disease Mother          Mar 1989    Diabetes Mother         Type 2    Arthritis Father     Vision loss Father         Macular Degeneration    Coronary artery disease Brother          2020    Diabetes Brother         Type 2    Diabetes Brother         Type 2    Cancer Brother         Leukemia              Social History    Social History     Tobacco Use    Smoking status: Never    Smokeless tobacco: Never   Vaping Use    Vaping status: Never Used   Substance Use Topics    Alcohol use: Not Currently     Comment: maybe one during holidays    Drug use: Never         Allergies    Allergies   Allergen Reactions    Gabapentin Other (See Comments)     \"out of body experience\"  \"Out of body experience\"      Other Swelling     SURGICAL STEEL  pain    Pregabalin Other (See Comments)     Blurred vision, off balance to the point point of falling    Statins Arthralgia and Myalgia     Severe arthralgias    Medical Tape Rash     welts           Medications    Current Outpatient Medications   Medication Instructions    ACETYLCYSTEINE IN INSTILL 1 DROP IN EACH EYE FOUR TIMES A DAY FOR DRY EYES    atoMOXetine (STRATTERA) 40 mg, Oral, Daily    buPROPion (WELLBUTRIN XL) 150 mg, Oral, Every morning    Cholecalciferol 50 MCG ( UT) CAPS 1 capsule, Oral, Daily    Cyanocobalamin (VITAMIN B-12 PO) Oral    DULoxetine (CYMBALTA) 60 mg, Oral, Daily    ezetimibe (ZETIA) 10 mg, Oral, Daily    fluticasone (FLONASE) 50 mcg/act nasal spray 1 spray, Nasal, Daily at bedtime    meloxicam (MOBIC) 15 mg tablet 1 tablet, Oral, Every morning    nystatin (MYCOSTATIN) powder 1 Application, Apply externally, Daily    pantoprazole (PROTONIX) 40 mg, Oral, Daily    " "      Physical Exam    /70   Pulse 104   Ht 5' 1\" (1.549 m)   Wt 96.2 kg (212 lb)   SpO2 94%   BMI 40.06 kg/m²     GEN: AAO, No apparent distress.  Patient is well developed.  HEENT:  Pupils are equal, round and reactive.  Sclera are clear.  Fundoscopic exam is normal.  External ears are without lesions.  Oral pharynx is clear of ulcers or other lesions.  MMM.   NECK:  Supple.  There is no adenopathy appreciable in anterior or posterior cervical chains or supraclavicularly.  JVP is normal.    HEART: Regular rate and rhythm.  There is no appreciable murmur, gallop or rub.  LUNGS: Clear to auscultation.  ABD:  Soft, without tenderness, rebound or guarding.  No appreciable organomegally.  NEURO: Speech and cognition are normal.  Strength is 5/5 throughout.  Tone is normal.  DTRs are 2/4 at the knees, ankles and elbows.  Gait is normal.  SKIN: There are no rashes or lesions    MUSCULOSKELETAL:   + jordyn OA      ________________________________________________________________________          Results Review    Component  Ref Range & Units 2 yr ago   RHEUMATOID FACTOR  <=14.0 IU/mL 28.0 High      Specimen Collected: 03/14/22  8:20 AM    Performed by: [facility] Last Resulted: 03/14/22  1:26 PM   Received From: [facility]  Result Received: 08/24/23 12:55 PM    View Encounter        Received Information  Hepatitis C Antibody  Order: [id number]   suggestion  Information displayed in this report may not trend or trigger automated decision support.     Component  Ref Range & Units 2 yr ago   Hepatitis C Antibody  Non-Reactive Non-reactive         Impressions     DDD C spine  DDD L Spine  OA hips  OA knees  OA hands  H/o RA ( + RF)     Plans:    This is a 80 y/o female with clinical s/s osteoarthritis, DDD, radiculopathy    [facility]  [facility] and tried, MTX, Arava and HCQ and did not help. She was then told she has osteoarthritis.    I have offered to repeat serologies (declines for " now)    OTC Tylenol as needed    Recommend pain management    RTC  as needed        Thank you for involving me in this patient's care.        Sánchez Mendoza MD  [facility]

## 2024-12-16 ENCOUNTER — OFFICE VISIT (OUTPATIENT)
Age: 79
End: 2024-12-16
Payer: MEDICARE

## 2024-12-16 VITALS
WEIGHT: 209.44 LBS | DIASTOLIC BLOOD PRESSURE: 82 MMHG | HEIGHT: 62 IN | TEMPERATURE: 96.8 F | RESPIRATION RATE: 22 BRPM | OXYGEN SATURATION: 95 % | BODY MASS INDEX: 38.54 KG/M2 | SYSTOLIC BLOOD PRESSURE: 118 MMHG | HEART RATE: 74 BPM

## 2024-12-16 DIAGNOSIS — R73.03 PRE-DIABETES: ICD-10-CM

## 2024-12-16 DIAGNOSIS — E55.9 VITAMIN D DEFICIENCY: ICD-10-CM

## 2024-12-16 DIAGNOSIS — Z13.0 SCREENING FOR DEFICIENCY ANEMIA: ICD-10-CM

## 2024-12-16 DIAGNOSIS — F32.A DEPRESSIVE DISORDER: ICD-10-CM

## 2024-12-16 DIAGNOSIS — Z13.1 SCREENING FOR DIABETES MELLITUS: ICD-10-CM

## 2024-12-16 DIAGNOSIS — E78.5 HYPERLIPIDEMIA, UNSPECIFIED HYPERLIPIDEMIA TYPE: ICD-10-CM

## 2024-12-16 DIAGNOSIS — Z13.29 SCREENING FOR THYROID DISORDER: ICD-10-CM

## 2024-12-16 DIAGNOSIS — F43.10 POSTTRAUMATIC STRESS DISORDER: ICD-10-CM

## 2024-12-16 DIAGNOSIS — Z76.89 ENCOUNTER TO ESTABLISH CARE: Primary | ICD-10-CM

## 2024-12-16 DIAGNOSIS — F98.8 ATTENTION DEFICIT DISORDER (ADD) WITHOUT HYPERACTIVITY: ICD-10-CM

## 2024-12-16 DIAGNOSIS — N18.31 CHRONIC RENAL FAILURE, STAGE 3A (HCC): ICD-10-CM

## 2024-12-16 PROCEDURE — 99214 OFFICE O/P EST MOD 30 MIN: CPT

## 2024-12-16 PROCEDURE — G2211 COMPLEX E/M VISIT ADD ON: HCPCS

## 2024-12-16 NOTE — ASSESSMENT & PLAN NOTE
Pt was started on Zetia per cardiology in 08/2024    Repeat labs ordered;   Orders:    Albumin / creatinine urine ratio; Future

## 2024-12-16 NOTE — ASSESSMENT & PLAN NOTE
Pt reports that she is a  and has PTSD from services; denies SI or HI at this time; denies wanting to talk to a counselor;     GERALDINE-7 Flowsheet Screening      Flowsheet Row Most Recent Value   Over the last two weeks, how often have you been bothered by the following problems?     Feeling nervous, anxious, or on edge 0   Not being able to stop or control worrying 0   Worrying too much about different things 0   Trouble relaxing  0   Being so restless that it's hard to sit still 0   Becoming easily annoyed or irritable  1   Feeling afraid as if something awful might happen 0   How difficult have these problems made it for you to do your work, take care of things at home, or get along with other people?  Somewhat difficult   GERALDINE Score  1

## 2024-12-16 NOTE — ASSESSMENT & PLAN NOTE
Pt has a history of vitamin D deficiency; last vitamin d level was in 09/2023 which was normal; screening labs ordered   Orders:    Vitamin D 25 hydroxy; Future

## 2024-12-16 NOTE — ASSESSMENT & PLAN NOTE
Lab Results   Component Value Date    EGFR 50 05/29/2024    EGFR 50 09/01/2023    EGFR 55 (A) 08/27/2022    CREATININE 1.07 05/29/2024    CREATININE 1.07 09/01/2023    CREATININE 1.07 08/27/2022       Orders:    Albumin / creatinine urine ratio; Future      Patient reports that she did not know that she had kidney problems; pt repots that she has been eating a clean diet and increase hydration; repeat labs ordered;

## 2024-12-16 NOTE — PATIENT INSTRUCTIONS
Labs are walk in, patients do not need appointments or lab slips. Blood work is fasting (no food) 8-10 hours before draw. Please no food or liquid other than water and black coffee. Lab results will be sent to provider, provider will reach out regarding results via My Chart messaging or phone call.

## 2024-12-16 NOTE — PROGRESS NOTES
Name: Gina Andersen      : 1945      MRN: [id number]  Encounter Provider: Radha Simpson  Encounter Date: 2024   Encounter department: [facility] PRIMARY CARE  :  Assessment & Plan  Encounter to establish care  Patient seen in the office today to establish care; pt denies any complaints at this time;     Screening for diabetes mellitus  Patient previous A1c was 6.0; repeat labs ordered  Orders:    Hemoglobin A1C; Future    Chronic renal failure, stage 3a (HCC)  Lab Results   Component Value Date    EGFR 50 2024    EGFR 50 2023    EGFR 55 (A) 2022    CREATININE 1.07 2024    CREATININE 1.07 2023    CREATININE 1.07 2022       Orders:    Albumin / creatinine urine ratio; Future      Patient reports that she did not know that she had kidney problems; pt repots that she has been eating a clean diet and increase hydration; repeat labs ordered;   Attention deficit disorder (ADD) without hyperactivity  Pt reports that symptoms are managed with Strattera 40mg daily;     Posttraumatic stress disorder  Pt reports that she is a  and has PTSD from services; denies SI or HI at this time; denies wanting to talk to a counselor;     GERALDINE-7 Flowsheet Screening      Flowsheet Row Most Recent Value   Over the last two weeks, how often have you been bothered by the following problems?     Feeling nervous, anxious, or on edge 0   Not being able to stop or control worrying 0   Worrying too much about different things 0   Trouble relaxing  0   Being so restless that it's hard to sit still 0   Becoming easily annoyed or irritable  1   Feeling afraid as if something awful might happen 0   How difficult have these problems made it for you to do your work, take care of things at home, or get along with other people?  Somewhat difficult   GERALDINE Score  1             Depressive disorder  Pt reports that she has had increased thoughts of depression since her brother   in November but recently has gotten better; pt reports that she did have thoughts of why she is living right after her brother  but denies thoughts of SI and HI at this time; pt verbalized that she would contact office if symptoms got worse; pt contracts for safety with provider;     Talked again with pt reports depression screening; patient reports that she had severe thoughts of wanting to hurt herself but states that the symptoms have gotten better; patient denies SI and HI at this time and states that she would go to the ER if symptoms got bad;     F/u in 1 week;     PHQ-2/9 Depression Screening    Little interest or pleasure in doing things: 3 - nearly every day  Feeling down, depressed, or hopeless: 3 - nearly every day  Trouble falling or staying asleep, or sleeping too much: 2 - more than half the days  Feeling tired or having little energy: 1 - several days  Poor appetite or overeatin - several days  Trouble concentrating on things, such as reading the newspaper or watching television: 2 - more than half the days  Moving or speaking so slowly that other people could have noticed. Or the opposite - being so fidgety or restless that you have been moving around a lot more than usual: 1 - several days  Thoughts that you would be better off dead, or of hurting yourself in some way: 1 - several days          Hyperlipidemia, unspecified hyperlipidemia type  Pt was started on Zetia per cardiology in 2024    Repeat labs ordered;   Orders:    Albumin / creatinine urine ratio; Future    Pre-diabetes  Lat A1c was 6.0; repeat labs ordered;   Orders:    Hemoglobin A1C; Future    Vitamin D deficiency  Pt has a history of vitamin D deficiency; last vitamin d level was in 2023 which was normal; screening labs ordered   Orders:    Vitamin D 25 hydroxy; Future    Screening for deficiency anemia  Screening labs ordered;   Orders:    CBC and differential; Future    Comprehensive metabolic panel;  "Future    Screening for thyroid disorder  Screening labs ordered   Orders:    TSH, 3rd generation with Free T4 reflex; Future           History of Present Illness     HPI  Gina Andersen is a 79 year old female seen in the office to establish care with new provider; patient denies any medical complaints at this time; pt reports that she an increase depression thoughts after her brother  in November but symptoms have gotten better; pt reports that she did have thoughts of why she is living right after brother's death but does not have those thoughts anymore; patient contracts for safety with provider and states that she would seek help if those thoughts came back;       F/u in 1 week for medicare wellness exam and lab results;   Review of Systems   Constitutional:  Negative for chills, fatigue and fever.   HENT:  Negative for congestion and voice change.    Respiratory:  Negative for cough, choking and shortness of breath.    Cardiovascular:  Negative for chest pain and palpitations.   Gastrointestinal:  Negative for abdominal pain, constipation, diarrhea, nausea and vomiting.   Musculoskeletal:  Negative for arthralgias.   Skin:  Negative for color change.   Neurological:  Negative for dizziness, weakness and headaches.   Psychiatric/Behavioral:  Negative for confusion.        Objective   /82 (BP Location: Left arm, Patient Position: Sitting, Cuff Size: Large)   Pulse 94   Temp (!) 96.8 °F (36 °C) (Tympanic)   Resp 22   Ht 5' 2\" (1.575 m)   Wt 95 kg (209 lb 7 oz)   SpO2 95%   BMI 38.31 kg/m²      Physical Exam  Constitutional:       General: She is not in acute distress.     Appearance: Normal appearance. She is not ill-appearing, toxic-appearing or diaphoretic.   HENT:      Head: Normocephalic and atraumatic.      Mouth/Throat:      Mouth: Mucous membranes are moist.   Cardiovascular:      Rate and Rhythm: Normal rate and regular rhythm.      Pulses: Normal pulses.      Heart sounds: Normal " heart sounds. No murmur heard.  Pulmonary:      Effort: Pulmonary effort is normal.      Breath sounds: Normal breath sounds. No wheezing or rales.   Neurological:      General: No focal deficit present.      Mental Status: She is alert and oriented to person, place, and time. Mental status is at baseline.      Motor: No weakness.      Coordination: Coordination normal.   Psychiatric:         Mood and Affect: Mood normal.         Behavior: Behavior normal.         Thought Content: Thought content normal.         Judgment: Judgment normal.

## 2024-12-16 NOTE — ASSESSMENT & PLAN NOTE
Pt reports that she has had increased thoughts of depression since her brother  in November but recently has gotten better; pt reports that she did have thoughts of why she is living right after her brother  but denies thoughts of SI and HI at this time; pt verbalized that she would contact office if symptoms got worse; pt contracts for safety with provider;     Talked again with pt reports depression screening; patient reports that she had severe thoughts of wanting to hurt herself but states that the symptoms have gotten better; patient denies SI and HI at this time and states that she would go to the ER if symptoms got bad;     F/u in 1 week;     PHQ-2/9 Depression Screening    Little interest or pleasure in doing things: 3 - nearly every day  Feeling down, depressed, or hopeless: 3 - nearly every day  Trouble falling or staying asleep, or sleeping too much: 2 - more than half the days  Feeling tired or having little energy: 1 - several days  Poor appetite or overeatin - several days  Trouble concentrating on things, such as reading the newspaper or watching television: 2 - more than half the days  Moving or speaking so slowly that other people could have noticed. Or the opposite - being so fidgety or restless that you have been moving around a lot more than usual: 1 - several days  Thoughts that you would be better off dead, or of hurting yourself in some way: 1 - several days

## 2024-12-18 ENCOUNTER — APPOINTMENT (OUTPATIENT)
Age: 79
End: 2024-12-18
Payer: MEDICARE

## 2024-12-18 DIAGNOSIS — Z13.1 SCREENING FOR DIABETES MELLITUS: ICD-10-CM

## 2024-12-18 DIAGNOSIS — E78.5 DYSLIPIDEMIA: ICD-10-CM

## 2024-12-18 DIAGNOSIS — N18.31 CHRONIC RENAL FAILURE, STAGE 3A (HCC): ICD-10-CM

## 2024-12-18 DIAGNOSIS — E78.5 HYPERLIPIDEMIA, UNSPECIFIED HYPERLIPIDEMIA TYPE: ICD-10-CM

## 2024-12-18 DIAGNOSIS — E55.9 VITAMIN D DEFICIENCY: ICD-10-CM

## 2024-12-18 DIAGNOSIS — R73.03 PRE-DIABETES: ICD-10-CM

## 2024-12-18 DIAGNOSIS — Z13.29 SCREENING FOR THYROID DISORDER: ICD-10-CM

## 2024-12-18 DIAGNOSIS — Z13.0 SCREENING FOR DEFICIENCY ANEMIA: ICD-10-CM

## 2024-12-18 LAB
25(OH)D3 SERPL-MCNC: 38 NG/ML (ref 30–100)
ALBUMIN SERPL BCG-MCNC: 4 G/DL (ref 3.5–5)
ALP SERPL-CCNC: 102 U/L (ref 34–104)
ALT SERPL W P-5'-P-CCNC: 11 U/L (ref 7–52)
ANION GAP SERPL CALCULATED.3IONS-SCNC: 8 MMOL/L (ref 4–13)
AST SERPL W P-5'-P-CCNC: 16 U/L (ref 13–39)
BASOPHILS # BLD AUTO: 0.04 THOUSANDS/ÂΜL (ref 0–0.1)
BASOPHILS NFR BLD AUTO: 1 % (ref 0–1)
BILIRUB SERPL-MCNC: 0.52 MG/DL (ref 0.2–1)
BUN SERPL-MCNC: 21 MG/DL (ref 5–25)
CALCIUM SERPL-MCNC: 8.8 MG/DL (ref 8.4–10.2)
CHLORIDE SERPL-SCNC: 103 MMOL/L (ref 96–108)
CHOLEST SERPL-MCNC: 219 MG/DL (ref ?–200)
CO2 SERPL-SCNC: 30 MMOL/L (ref 21–32)
CREAT SERPL-MCNC: 0.95 MG/DL (ref 0.6–1.3)
EOSINOPHIL # BLD AUTO: 0.25 THOUSAND/ÂΜL (ref 0–0.61)
EOSINOPHIL NFR BLD AUTO: 5 % (ref 0–6)
ERYTHROCYTE [DISTWIDTH] IN BLOOD BY AUTOMATED COUNT: 12.1 % (ref 11.6–15.1)
EST. AVERAGE GLUCOSE BLD GHB EST-MCNC: 134 MG/DL
GFR SERPL CREATININE-BSD FRML MDRD: 57 ML/MIN/1.73SQ M
GLUCOSE P FAST SERPL-MCNC: 98 MG/DL (ref 65–99)
HBA1C MFR BLD: 6.3 %
HCT VFR BLD AUTO: 48.5 % (ref 34.8–46.1)
HDLC SERPL-MCNC: 71 MG/DL
HGB BLD-MCNC: 15 G/DL (ref 11.5–15.4)
IMM GRANULOCYTES # BLD AUTO: 0.02 THOUSAND/UL (ref 0–0.2)
IMM GRANULOCYTES NFR BLD AUTO: 0 % (ref 0–2)
LDLC SERPL CALC-MCNC: 118 MG/DL (ref 0–100)
LYMPHOCYTES # BLD AUTO: 1.81 THOUSANDS/ÂΜL (ref 0.6–4.47)
LYMPHOCYTES NFR BLD AUTO: 34 % (ref 14–44)
MCH RBC QN AUTO: 29.8 PG (ref 26.8–34.3)
MCHC RBC AUTO-ENTMCNC: 30.9 G/DL (ref 31.4–37.4)
MCV RBC AUTO: 96 FL (ref 82–98)
MONOCYTES # BLD AUTO: 0.44 THOUSAND/ÂΜL (ref 0.17–1.22)
MONOCYTES NFR BLD AUTO: 8 % (ref 4–12)
NEUTROPHILS # BLD AUTO: 2.72 THOUSANDS/ÂΜL (ref 1.85–7.62)
NEUTS SEG NFR BLD AUTO: 52 % (ref 43–75)
NRBC BLD AUTO-RTO: 0 /100 WBCS
PLATELET # BLD AUTO: 250 THOUSANDS/UL (ref 149–390)
PMV BLD AUTO: 10.5 FL (ref 8.9–12.7)
POTASSIUM SERPL-SCNC: 3.8 MMOL/L (ref 3.5–5.3)
PROT SERPL-MCNC: 6.7 G/DL (ref 6.4–8.4)
RBC # BLD AUTO: 5.03 MILLION/UL (ref 3.81–5.12)
SODIUM SERPL-SCNC: 141 MMOL/L (ref 135–147)
TRIGL SERPL-MCNC: 151 MG/DL (ref ?–150)
TSH SERPL DL<=0.05 MIU/L-ACNC: 1.65 UIU/ML (ref 0.45–4.5)
WBC # BLD AUTO: 5.28 THOUSAND/UL (ref 4.31–10.16)

## 2024-12-18 PROCEDURE — 80061 LIPID PANEL: CPT

## 2024-12-18 PROCEDURE — 36415 COLL VENOUS BLD VENIPUNCTURE: CPT

## 2024-12-18 PROCEDURE — 85025 COMPLETE CBC W/AUTO DIFF WBC: CPT

## 2024-12-18 PROCEDURE — 80053 COMPREHEN METABOLIC PANEL: CPT

## 2024-12-18 PROCEDURE — 83036 HEMOGLOBIN GLYCOSYLATED A1C: CPT

## 2024-12-18 PROCEDURE — 84443 ASSAY THYROID STIM HORMONE: CPT

## 2024-12-18 PROCEDURE — 82306 VITAMIN D 25 HYDROXY: CPT

## 2024-12-20 ENCOUNTER — RESULTS FOLLOW-UP (OUTPATIENT)
Age: 79
End: 2024-12-20

## 2024-12-23 ENCOUNTER — OFFICE VISIT (OUTPATIENT)
Age: 79
End: 2024-12-23
Payer: MEDICARE

## 2024-12-23 VITALS
HEART RATE: 94 BPM | HEIGHT: 61 IN | BODY MASS INDEX: 39.88 KG/M2 | WEIGHT: 211.2 LBS | SYSTOLIC BLOOD PRESSURE: 128 MMHG | RESPIRATION RATE: 18 BRPM | DIASTOLIC BLOOD PRESSURE: 86 MMHG | TEMPERATURE: 96.1 F | OXYGEN SATURATION: 98 %

## 2024-12-23 DIAGNOSIS — F43.10 POSTTRAUMATIC STRESS DISORDER: ICD-10-CM

## 2024-12-23 DIAGNOSIS — F98.8 ATTENTION DEFICIT DISORDER (ADD) WITHOUT HYPERACTIVITY: ICD-10-CM

## 2024-12-23 DIAGNOSIS — E78.5 HYPERLIPIDEMIA, UNSPECIFIED HYPERLIPIDEMIA TYPE: ICD-10-CM

## 2024-12-23 DIAGNOSIS — G47.33 OBSTRUCTIVE SLEEP APNEA OF ADULT: ICD-10-CM

## 2024-12-23 DIAGNOSIS — E78.5 DYSLIPIDEMIA: ICD-10-CM

## 2024-12-23 DIAGNOSIS — E55.9 VITAMIN D DEFICIENCY: ICD-10-CM

## 2024-12-23 DIAGNOSIS — E66.9 OBESITY (BMI 30-39.9): ICD-10-CM

## 2024-12-23 DIAGNOSIS — Z00.00 MEDICARE ANNUAL WELLNESS VISIT, SUBSEQUENT: Primary | ICD-10-CM

## 2024-12-23 DIAGNOSIS — N18.31 CHRONIC RENAL FAILURE, STAGE 3A (HCC): ICD-10-CM

## 2024-12-23 DIAGNOSIS — F32.A DEPRESSIVE DISORDER: ICD-10-CM

## 2024-12-23 DIAGNOSIS — N32.81 OVERACTIVE BLADDER: ICD-10-CM

## 2024-12-23 DIAGNOSIS — R73.03 PRE-DIABETES: ICD-10-CM

## 2024-12-23 LAB
CREAT UR-MCNC: 162.5 MG/DL
MICROALBUMIN UR-MCNC: 10.5 MG/L
MICROALBUMIN/CREAT 24H UR: 6 MG/G CREATININE (ref 0–30)

## 2024-12-23 PROCEDURE — 82043 UR ALBUMIN QUANTITATIVE: CPT

## 2024-12-23 PROCEDURE — G0439 PPPS, SUBSEQ VISIT: HCPCS

## 2024-12-23 PROCEDURE — 82570 ASSAY OF URINE CREATININE: CPT

## 2024-12-23 NOTE — ASSESSMENT & PLAN NOTE
Pt reports that sometimes that she has issues getting to the bathroom; denies urgency or burning; declines any intervention at this time

## 2024-12-23 NOTE — ASSESSMENT & PLAN NOTE
Pt reports that she is a  and has PTSD from services; denies SI or HI at this time; denies wanting to talk to a counselor;

## 2024-12-23 NOTE — ASSESSMENT & PLAN NOTE
Lab Results   Component Value Date    EGFR 57 12/18/2024    EGFR 50 05/29/2024    EGFR 50 09/01/2023    CREATININE 0.95 12/18/2024    CREATININE 1.07 05/29/2024    CREATININE 1.07 09/01/2023   Patient denies complaint or concerns; verbalized understanding of needing to go to a nephrologist;     Orders:    Ambulatory Referral to Nephrology; Future    Comprehensive metabolic panel; Future    Albumin / creatinine urine ratio; Future

## 2024-12-23 NOTE — ASSESSMENT & PLAN NOTE
Lipid panel showed triglycerides 151; LDL- 118 and HDL- 71; patient is managed by cardiology; educated on importance of healthy diet and exercise;    Patient states that she will follow up with cardiology ASAP;

## 2024-12-23 NOTE — PROGRESS NOTES
Name: Gina Andersen      : 1945      MRN: [id number]  Encounter Provider: Radha Simpson  Encounter Date: 2024   Encounter department: [facility] PRIMARY CARE    Assessment & Plan  Medicare annual wellness visit, subsequent  Patient seen in the office today for medicare well exam; denies any complaints or concerns at this time     UTD on vision exams  UTD on dental exam    Patient reports sometime urinary incontinent; educated importance on scheduled bathroom breaks; pt denies any intervention at this time    Patient GFR was progressive worsen over time; referral placed for nephrology; denies states that she has never seen a specialist;     UTD on vaccinations  Repeat lipid panel and kidney function in 3 months;     All labs reviewed with patient  Dyslipidemia  Pt follows with cardiology for hyperlipidemia; lipids have improved; education on importance of low fat diet and exercise; patient verbalized understanding     Pt reports that she needs to call and schedule an follow up appointment   Denies any complaints or concerns at this time  Denies chest pain but states that she is SOB when she is really active;   Orders:    Lipid panel; Future    Obstructive sleep apnea of adult  Patient states that she has a CPAP at home but states that she does not wear it due to not fitting correct; referral placed for sleep medicine;   Chronic renal failure, stage 3a (HCC)  Lab Results   Component Value Date    EGFR 57 2024    EGFR 50 2024    EGFR 50 2023    CREATININE 0.95 2024    CREATININE 1.07 2024    CREATININE 1.07 2023   Patient denies complaint or concerns; verbalized understanding of needing to go to a nephrologist;     Orders:    Ambulatory Referral to Nephrology; Future    Comprehensive metabolic panel; Future    Albumin / creatinine urine ratio; Future    Attention deficit disorder (ADD) without hyperactivity  Pt reports that symptoms are managed  with Strattera 40mg daily;     Depressive disorder  Depression Screening Follow-up Plan: Patient's depression screening was positive with a PHQ-9 score of 9. Patient with underlying depression and was advised to continue current medications as prescribed.    Patient states that depression symptoms are better than last week;   Posttraumatic stress disorder  Pt reports that she is a  and has PTSD from services; denies SI or HI at this time; denies wanting to talk to a counselor;     Hyperlipidemia, unspecified hyperlipidemia type  Lipid panel showed triglycerides 151; LDL- 118 and HDL- 71; patient is managed by cardiology; educated on importance of healthy diet and exercise;    Patient states that she will follow up with cardiology ASAP;     Pre-diabetes  Recent A1C was 6.3; educated on importance of healthy diet and exercise;     Denies any complaints or concerns at this time     Vitamin D deficiency  Recent levels were 38.0; denies any complaints or concerns at this time;     Obesity (BMI 30-39.9)  ducated on importance of healthy diet and exercise;     Overactive bladder  Pt reports that sometimes that she has issues getting to the bathroom; denies urgency or burning; declines any intervention at this time        Preventive health issues were discussed with patient, and age appropriate screening tests were ordered as noted in patient's After Visit Summary. Personalized health advice and appropriate referrals for health education or preventive services given if needed, as noted in patient's After Visit Summary.    History of Present Illness     HPI   Gina Andersen is a 79 year old female seen in the the office today for Medicare Wellness Exam; denies complaints or concerns at this time    Referral placed for nephrology   Repeat labs to be done in 3 months;  Follow up in 3 months;  Patient Care Team:  Radha Simpson as PCP - General (Family Medicine)    Review of Systems   Constitutional:  Negative  for chills and fever.   HENT:  Negative for ear pain and sore throat.    Eyes:  Negative for pain and visual disturbance.   Respiratory:  Negative for cough and shortness of breath.    Cardiovascular:  Negative for chest pain and palpitations.   Gastrointestinal:  Negative for abdominal pain and vomiting.   Genitourinary:  Negative for dysuria and hematuria.   Musculoskeletal:  Negative for arthralgias and back pain.   Skin:  Negative for color change and rash.   Neurological:  Negative for dizziness, seizures, syncope and headaches.   Psychiatric/Behavioral:  Negative for confusion.    All other systems reviewed and are negative.    Medical History Reviewed by provider this encounter:  Tobacco  Allergies  Meds  Problems  Med Hx  Surg Hx  Fam Hx       Annual Wellness Visit Questionnaire       Health Risk Assessment:   Patient rates overall health as good. Patient feels that their physical health rating is slightly better. Patient is satisfied with their life. Eyesight was rated as same. Hearing was rated as same. Patient feels that their emotional and mental health rating is slightly better. Patients states they are sometimes angry. Patient states they are often unusually tired/fatigued. Pain experienced in the last 7 days has been some. Patient's pain rating has been 6/10. Patient states that she has experienced no weight loss or gain in last 6 months.     Depression Screening:   PHQ-9 Score: 9      Fall Risk Screening:   In the past year, patient has experienced: no history of falling in past year      Urinary Incontinence Screening:   Patient has leaked urine accidently in the last six months.     Home Safety:  Patient has trouble with stairs inside or outside of their home. Patient has working smoke alarms and has no working carbon monoxide detector. Home safety hazards include: none.     Nutrition:   Current diet is Unhealthy.     Medications:   Patient is not currently taking any over-the-counter  supplements. Patient is able to manage medications.     Activities of Daily Living (ADLs)/Instrumental Activities of Daily Living (IADLs):   Walk and transfer into and out of bed and chair?: Yes  Dress and groom yourself?: Yes    Bathe or shower yourself?: Yes    Feed yourself? Yes  Do your laundry/housekeeping?: Yes  Manage your money, pay your bills and track your expenses?: Yes  Make your own meals?: Yes    Do your own shopping?: Yes    Previous Hospitalizations:   Any hospitalizations or ED visits within the last 12 months?: No      Advance Care Planning:   Living will: No    Durable POA for healthcare: No    Advanced directive: No      Screening, Brief Intervention, and Referral to Treatment (SBIRT)    Screening  Typical number of drinks in a day: 0  Typical number of drinks in a week: 0  Interpretation: Low risk drinking behavior.    AUDIT-C Screenin) How often did you have a drink containing alcohol in the past year? never  2) How many drinks did you have on a typical day when you were drinking in the past year? 0  3) How often did you have 6 or more drinks on one occasion in the past year? never    AUDIT-C Score: 0  Interpretation: Score 0-2 (female): Negative screen for alcohol misuse    Single Item Drug Screening:  How often have you used an illegal drug (including marijuana) or a prescription medication for non-medical reasons in the past year? never    Single Item Drug Screen Score: 0  Interpretation: Negative screen for possible drug use disorder    Social Drivers of Health     Financial Resource Strain: Low Risk  (2023)    Overall Financial Resource Strain (CARDIA)     Difficulty of Paying Living Expenses: Not hard at all   Food Insecurity: No Food Insecurity (2024)    Hunger Vital Sign     Worried About Running Out of Food in the Last Year: Never true     Ran Out of Food in the Last Year: Never true   Transportation Needs: No Transportation Needs (2024)    PRAPARE -  "Transportation     Lack of Transportation (Medical): No     Lack of Transportation (Non-Medical): No   Housing Stability: Low Risk  (12/22/2024)    Housing Stability Vital Sign     Unable to Pay for Housing in the Last Year: No     Number of Times Moved in the Last Year: 0     Homeless in the Last Year: No   Utilities: Not At Risk (12/22/2024)    [facility] Utilities     Threatened with loss of utilities: No     No results found.    Objective   /86 (BP Location: Left arm, Patient Position: Sitting, Cuff Size: Large)   Pulse 94   Temp (!) 96.1 °F (35.6 °C) (Tympanic)   Resp 18   Ht 5' 1\" (1.549 m)   Wt 95.8 kg (211 lb 3.2 oz)   SpO2 98%   BMI 39.91 kg/m²     Physical Exam  Vitals and nursing note reviewed.   Constitutional:       General: She is not in acute distress.     Appearance: She is well-developed.   HENT:      Head: Normocephalic and atraumatic.      Right Ear: Tympanic membrane, ear canal and external ear normal.      Left Ear: Tympanic membrane, ear canal and external ear normal.      Nose: Nose normal.      Mouth/Throat:      Mouth: Mucous membranes are moist.   Eyes:      Conjunctiva/sclera: Conjunctivae normal.   Cardiovascular:      Rate and Rhythm: Normal rate and regular rhythm.      Heart sounds: No murmur heard.  Pulmonary:      Effort: Pulmonary effort is normal. No respiratory distress.      Breath sounds: Normal breath sounds.   Abdominal:      General: Bowel sounds are normal.      Palpations: Abdomen is soft.      Tenderness: There is no abdominal tenderness.   Musculoskeletal:         General: No swelling.      Cervical back: Neck supple.      Right lower leg: No edema.      Left lower leg: No edema.   Skin:     General: Skin is warm and dry.      Capillary Refill: Capillary refill takes less than 2 seconds.   Neurological:      General: No focal deficit present.      Mental Status: She is alert and oriented to person, place, and time. Mental status is at baseline.      Motor: No " weakness.      Coordination: Coordination normal.   Psychiatric:         Mood and Affect: Mood normal.         Behavior: Behavior normal.         Thought Content: Thought content normal.         Judgment: Judgment normal.

## 2024-12-23 NOTE — ASSESSMENT & PLAN NOTE
Recent A1C was 6.3; educated on importance of healthy diet and exercise;     Denies any complaints or concerns at this time

## 2024-12-23 NOTE — PATIENT INSTRUCTIONS
Medicare Preventive Visit Patient Instructions  Thank you for completing your Welcome to Medicare Visit or Medicare Annual Wellness Visit today. Your next wellness visit will be due in one year (12/24/2025).  The screening/preventive services that you may require over the next 5-10 years are detailed below. Some tests may not apply to you based off risk factors and/or age. Screening tests ordered at today's visit but not completed yet may show as past due. Also, please note that scanned in results may not display below.  Preventive Screenings:  Service Recommendations Previous Testing/Comments   Colorectal Cancer Screening  * Colonoscopy    * Fecal Occult Blood Test (FOBT)/Fecal Immunochemical Test (FIT)  * Fecal DNA/Cologuard Test  * Flexible Sigmoidoscopy Age: 45-75 years old   Colonoscopy: every 10 years (may be performed more frequently if at higher risk)  OR  FOBT/FIT: every 1 year  OR  Cologuard: every 3 years  OR  Sigmoidoscopy: every 5 years  Screening may be recommended earlier than age 45 if at higher risk for colorectal cancer. Also, an individualized decision between you and your healthcare provider will decide whether screening between the ages of 76-85 would be appropriate. Colonoscopy: Not on file  FOBT/FIT: Not on file  Cologuard: 05/08/2023  Sigmoidoscopy: Not on file          Breast Cancer Screening Age: 40+ years old  Frequency: every 1-2 years  Not required if history of left and right mastectomy Mammogram: 03/30/2023        Cervical Cancer Screening Between the ages of 21-29, pap smear recommended once every 3 years.   Between the ages of 30-65, can perform pap smear with HPV co-testing every 5 years.   Recommendations may differ for women with a history of total hysterectomy, cervical cancer, or abnormal pap smears in past. Pap Smear: Not on file        Hepatitis C Screening Once for adults born between 1945 and 1965  More frequently in patients at high risk for Hepatitis C Hep C Antibody:  09/01/2023        Diabetes Screening 1-2 times per year if you're at risk for diabetes or have pre-diabetes Fasting glucose: 98 mg/dL (12/18/2024)  A1C: 6.3 % (12/18/2024)      Cholesterol Screening Once every 5 years if you don't have a lipid disorder. May order more often based on risk factors. Lipid panel: 12/18/2024          Other Preventive Screenings Covered by Medicare:  Abdominal Aortic Aneurysm (AAA) Screening: covered once if your at risk. You're considered to be at risk if you have a family history of AAA.  Lung Cancer Screening: covers low dose CT scan once per year if you meet all of the following conditions: (1) Age 55-77; (2) No signs or symptoms of lung cancer; (3) Current smoker or have quit smoking within the last 15 years; (4) You have a tobacco smoking history of at least 20 pack years (packs per day multiplied by number of years you smoked); (5) You get a written order from a healthcare provider.  Glaucoma Screening: covered annually if you're considered high risk: (1) You have diabetes OR (2) Family history of glaucoma OR (3)  aged 50 and older OR (4)  American aged 65 and older  Osteoporosis Screening: covered every 2 years if you meet one of the following conditions: (1) You're estrogen deficient and at risk for osteoporosis based off medical history and other findings; (2) Have a vertebral abnormality; (3) On glucocorticoid therapy for more than 3 months; (4) Have primary hyperparathyroidism; (5) On osteoporosis medications and need to assess response to drug therapy.   Last bone density test (DXA Scan): 05/15/2023.  HIV Screening: covered annually if you're between the age of 15-65. Also covered annually if you are younger than 15 and older than 65 with risk factors for HIV infection. For pregnant patients, it is covered up to 3 times per pregnancy.    Immunizations:  Immunization Recommendations   Influenza Vaccine Annual influenza vaccination during flu season is  recommended for all persons aged >= 6 months who do not have contraindications   Pneumococcal Vaccine   * Pneumococcal conjugate vaccine = PCV13 (Prevnar 13), PCV15 (Vaxneuvance), PCV20 (Prevnar 20)  * Pneumococcal polysaccharide vaccine = PPSV23 (Pneumovax) Adults 19-63 yo with certain risk factors or if 65+ yo  If never received any pneumonia vaccine: recommend Prevnar 20 (PCV20)  Give PCV20 if previously received 1 dose of PCV13 or PPSV23   Hepatitis B Vaccine 3 dose series if at intermediate or high risk (ex: diabetes, end stage renal disease, liver disease)   Respiratory syncytial virus (RSV) Vaccine - COVERED BY MEDICARE PART D  * RSVPreF3 (Arexvy) CDC recommends that adults 60 years of age and older may receive a single dose of RSV vaccine using shared clinical decision-making (SCDM)   Tetanus (Td) Vaccine - COST NOT COVERED BY MEDICARE PART B Following completion of primary series, a booster dose should be given every 10 years to maintain immunity against tetanus. Td may also be given as tetanus wound prophylaxis.   Tdap Vaccine - COST NOT COVERED BY MEDICARE PART B Recommended at least once for all adults. For pregnant patients, recommended with each pregnancy.   Shingles Vaccine (Shingrix) - COST NOT COVERED BY MEDICARE PART B  2 shot series recommended in those 19 years and older who have or will have weakened immune systems or those 50 years and older     Health Maintenance Due:      Topic Date Due   • Hepatitis C Screening  Completed   • Colorectal Cancer Screening  Discontinued     Immunizations Due:  There are no preventive care reminders to display for this patient.  Advance Directives   What are advance directives?  Advance directives are legal documents that state your wishes and plans for medical care. These plans are made ahead of time in case you lose your ability to make decisions for yourself. Advance directives can apply to any medical decision, such as the treatments you want, and if you  want to donate organs.   What are the types of advance directives?  There are many types of advance directives, and each state has rules about how to use them. You may choose a combination of any of the following:  Living will:  This is a written record of the treatment you want. You can also choose which treatments you do not want, which to limit, and which to stop at a certain time. This includes surgery, medicine, IV fluid, and tube feedings.   Durable power of  for healthcare (DPAHC):  This is a written record that states who you want to make healthcare choices for you when you are unable to make them for yourself. This person, called a proxy, is usually a family member or a friend. You may choose more than 1 proxy.  Do not resuscitate (DNR) order:  A DNR order is used in case your heart stops beating or you stop breathing. It is a request not to have certain forms of treatment, such as CPR. A DNR order may be included in other types of advance directives.  Medical directive:  This covers the care that you want if you are in a coma, near death, or unable to make decisions for yourself. You can list the treatments you want for each condition. Treatment may include pain medicine, surgery, blood transfusions, dialysis, IV or tube feedings, and a ventilator (breathing machine).  Values history:  This document has questions about your views, beliefs, and how you feel and think about life. This information can help others choose the care that you would choose.  Why are advance directives important?  An advance directive helps you control your care. Although spoken wishes may be used, it is better to have your wishes written down. Spoken wishes can be misunderstood, or not followed. Treatments may be given even if you do not want them. An advance directive may make it easier for your family to make difficult choices about your care.   Weight Management   Why it is important to manage your weight:  Being  overweight increases your risk of health conditions such as heart disease, high blood pressure, type 2 diabetes, and certain types of cancer. It can also increase your risk for osteoarthritis, sleep apnea, and other respiratory problems. Aim for a slow, steady weight loss. Even a small amount of weight loss can lower your risk of health problems.  How to lose weight safely:  A safe and healthy way to lose weight is to eat fewer calories and get regular exercise. You can lose up about 1 pound a week by decreasing the number of calories you eat by 500 calories each day.   Healthy meal plan for weight management:  A healthy meal plan includes a variety of foods, contains fewer calories, and helps you stay healthy. A healthy meal plan includes the following:  Eat whole-grain foods more often.  A healthy meal plan should contain fiber. Fiber is the part of grains, fruits, and vegetables that is not broken down by your body. Whole-grain foods are healthy and provide extra fiber in your diet. Some examples of whole-grain foods are whole-wheat breads and pastas, oatmeal, brown rice, and bulgur.  Eat a variety of vegetables every day.  Include dark, leafy greens such as spinach, kale, [place]oor greens, and mustard greens. Eat yellow and orange vegetables such as carrots, sweet potatoes, and winter squash.   Eat a variety of fruits every day.  Choose fresh or canned fruit (canned in its own juice or light syrup) instead of juice. Fruit juice has very little or no fiber.  Eat low-fat dairy foods.  Drink fat-free (skim) milk or 1% milk. Eat fat-free yogurt and low-fat cottage cheese. Try low-fat cheeses such as mozzarella and other reduced-fat cheeses.  Choose meat and other protein foods that are low in fat.  Choose beans or other legumes such as split peas or lentils. Choose fish, skinless poultry (chicken or turkey), or lean cuts of red meat (beef or pork). Before you cook meat or poultry, cut off any visible fat.   Use less  fat and oil.  Try baking foods instead of frying them. Add less fat, such as margarine, sour cream, regular salad dressing and mayonnaise to foods. Eat fewer high-fat foods. Some examples of high-fat foods include french fries, doughnuts, ice cream, and cakes.  Eat fewer sweets.  Limit foods and drinks that are high in sugar. This includes candy, cookies, regular soda, and sweetened drinks.  Exercise:  Exercise at least 30 minutes per day on most days of the week. Some examples of exercise include walking, biking, dancing, and swimming. You can also fit in more physical activity by taking the stairs instead of the elevator or parking farther away from stores. Ask your healthcare provider about the best exercise plan for you.      © Copyright [employer] 2018 Information is for End User's use only and may not be sold, redistributed or otherwise used for commercial purposes. All illustrations and images included in CareNotes® are the copyrighted property of [employer].A[employer] Inc[employer] or [employer]

## 2024-12-23 NOTE — ASSESSMENT & PLAN NOTE
Patient states that she has a CPAP at home but states that she does not wear it due to not fitting correct; referral placed for sleep medicine;

## 2024-12-23 NOTE — ASSESSMENT & PLAN NOTE
Depression Screening Follow-up Plan: Patient's depression screening was positive with a PHQ-9 score of 9. Patient with underlying depression and was advised to continue current medications as prescribed.    Patient states that depression symptoms are better than last week;

## 2024-12-24 ENCOUNTER — RESULTS FOLLOW-UP (OUTPATIENT)
Age: 79
End: 2024-12-24

## 2024-12-24 NOTE — TELEPHONE ENCOUNTER
----- Message from Radha Simpson sent at 12/24/2024  8:11 AM EST -----  Please call patient and let her know that her Albumin/Creatinine Ratio test for kidney function was normal

## 2024-12-30 DIAGNOSIS — Z00.6 ENCOUNTER FOR EXAMINATION FOR NORMAL COMPARISON OR CONTROL IN CLINICAL RESEARCH PROGRAM: ICD-10-CM

## 2025-01-09 DIAGNOSIS — F98.8 ATTENTION DEFICIT DISORDER (ADD) WITHOUT HYPERACTIVITY: Primary | ICD-10-CM

## 2025-01-11 RX ORDER — ATOMOXETINE 40 MG/1
40 CAPSULE ORAL DAILY
Qty: 30 CAPSULE | Refills: 0 | Status: SHIPPED | OUTPATIENT
Start: 2025-01-11

## 2025-01-17 DIAGNOSIS — F98.8 ATTENTION DEFICIT DISORDER (ADD) WITHOUT HYPERACTIVITY: ICD-10-CM

## 2025-01-17 NOTE — TELEPHONE ENCOUNTER
Not a duplicate, pharmacy does not have order.    Reason for call:   [x] Refill   [] Prior Auth  [] Other:     Office:   [x] PCP/Provider - Barbi Beebe,   [] Specialty/Provider -     Medication: atoMOXetine (STRATTERA) 40 mg     Dose/Frequency: Take 1 capsule (40 mg total) by mouth daily,     Quantity: 90    Pharmacy: [facility]    Does the patient have enough for 3 days?   [] Yes   [x] No - Send as HP to POD - patient is out but did not want a small quantity to local pharmacy, she will wait for mail order.

## 2025-01-20 RX ORDER — ATOMOXETINE 40 MG/1
40 CAPSULE ORAL DAILY
Qty: 90 CAPSULE | Refills: 0 | Status: SHIPPED | OUTPATIENT
Start: 2025-01-20

## 2025-01-20 NOTE — TELEPHONE ENCOUNTER
Please review refill request. Needs to be 90 for mail order. Spoke to patient she is out but, will just wait for this order.

## 2025-03-13 ENCOUNTER — OFFICE VISIT (OUTPATIENT)
Dept: SLEEP CENTER | Facility: CLINIC | Age: 80
End: 2025-03-13
Payer: MEDICARE

## 2025-03-13 VITALS
DIASTOLIC BLOOD PRESSURE: 78 MMHG | HEIGHT: 61 IN | BODY MASS INDEX: 37.95 KG/M2 | OXYGEN SATURATION: 96 % | WEIGHT: 201 LBS | SYSTOLIC BLOOD PRESSURE: 118 MMHG | HEART RATE: 86 BPM

## 2025-03-13 DIAGNOSIS — G47.21 DELAYED SLEEP PHASE SYNDROME: ICD-10-CM

## 2025-03-13 DIAGNOSIS — G47.33 OBSTRUCTIVE SLEEP APNEA OF ADULT: ICD-10-CM

## 2025-03-13 DIAGNOSIS — E66.812 CLASS 2 OBESITY: Primary | ICD-10-CM

## 2025-03-13 PROCEDURE — 99204 OFFICE O/P NEW MOD 45 MIN: CPT

## 2025-03-13 NOTE — PATIENT INSTRUCTIONS
- An in lab sleep study has been ordered to evaluate for sleep apnea.  This test should be scheduled at your earliest convenience.    - You may have sleep apnea.  Sleep apnea is a serious sleep disorder that occurs when a person's breathing is interrupted during sleep.  People with untreated sleep apnea stop breathing repeatedly during sleep.  - The most common type of sleep apnea is obstructive sleep apnea.  - If left untreated, obstructive sleep apnea can result in a number of health problems including high blood pressure, stroke, irregular heart rhythms, heart failure, diabetes, obesity and heart attacks.  - Treatment options for obstructive sleep apnea may include positive airway pressure (PAP) therapy, oral appliances, hypoglossal nerve stimulator, nose/throat surgery, weight loss, side sleeping or avoidance of medications or substances that can relax the airway muscles (alcohol, benzodiazepines and opioids).  - Avoid driving is drowsy.  It is recommended that if you are dozing while driving that you do not drive until your sleepiness is appropriately treated.  - It is important to lead a healthy lifestyle with adequate sleep (7-9 hours per night), balanced diet and routine exercise.

## 2025-03-13 NOTE — PROGRESS NOTES
"[facility]  Sleep Medicine New Patient Evaluation    PATIENT NAME: Gina Andersen  DATE OF SERVICE: March 13, 2025    CONSULTING PROVIDER:  Radha Simpson  [address] [facility] [address] [place] [id number]    ASSESSMENT/PLAN:  Gina Andersen is a 79 y.o. female with PMHx of SAW not on PAP therapy, CKD 3a, prediabetes, HLD, GERD, ADHD and obesity who presents for sleep evaluation.    Obstructive sleep apnea  - The patient has a history of potentially moderate or severe sleep apnea with prior PAP intolerance which appears to be mostly due to intolerance of FFMs.  She is doing better in her sleep lately since losing weight, but it is reasonable to retest to confirm presence or absence of sleep apnea.  - Will obtain Split-night PSG with split for AHI > 10, she will need a later bed time of [id number].  Suggest trying nasal or nasal pillows on the titration portion of the study.  - Discussed with the patient the possible diagnosis, causes and conditions associated with SDB along with potential treatments.  The patient would prefer a phone call to discuss results of testing and therapy options, but would be willing to consider PAP therapy with a nasal or nasal pillows.  - Encouraged healthy lifestyle with adequate sleep (7-9 hours per night), healthy balanced diet and routine exercise.  - Follow-up after sleep study  -     Ambulatory Referral to Sleep Medicine  -     Split Study; Future    Delayed sleep phase disorder  - The patient notes a lifelong history of being a \"night-owl\" and it appears that this was more bothersome previously when she was less active during the day.  Since she has increased her activity during the daytime she has been able to fall asleep earlier and naturally advance her sleep schedule.  - No additional interventions at this time, if worsening in the future can consider advancing sleep phase again with melatonin/bright light therapy.    Class 2 " obesity  - Weight loss is recommended, we discussed that with weight loss sleep apnea can improve or even resolve.    Thank you for allowing me to participate in the care of your patient.  If there are any questions regarding evaluation please feel free to reach out.   ________________________________________________________________________________________________    HPI: Gina Andersen is a 79 y.o. female with PMHx of SAW not on PAP therapy, CKD 3a, prediabetes, HLD, GERD, ADHD and obesity who presents for sleep evaluation.  Sleep-Related History: She thinks she was diagnosed with sleep apnea ~15 years ago and does have a device at home which is maybe 5 years old.  She had prior sleep physicians in [place] and [place].  She believes her sleep apnea was severe initially and moderate on more recent testing.    The patient was referred by her PCP due to a history of SAW with a CPAP she is not currently using.  She notes that her main issue with PAP therapy in the past was that she likes sleeping on her side and often this causes the mask to dislodge and leak.  She notes when wearing PAP therapy she would often feel more tired due to awakenings related to the device.  She states that historically she has had a lot of trouble with sleep and more recently had been moving house and been more active which led to her losing some weight and resulted in improved sleep quality.  She states she is currently sleep alone and is unsure about snoring, witnessed apneas or gasping/choking in sleep although she has been told about these things in the past.  Currently she is not having much daytime sleepiness since the weight loss, but was having significant sleepiness previously.  She otherwise denies any symptoms consistent with SP, HH, cataplexy, RLS, insomnia or parasomnias.    ESS: Total score: 4/24  Greater or equal to 10 is positive for excessive daytime sleepiness  Pertinent Meds: bupropion XL 150mg qd, duloxetine 60mg  qd    Sleep-Wake Schedule:  She is self-described as a night person.  Bedtime: 4895-4811 (previously 0490-7333, early time related to increased physical activity during the day)  Wake Time: 0900  Difficulty Falling Asleep: No (but was before she was more recently active)  Avg Number of Awakenings: 0x  Cause of Awakenings: NA  Weekend Sleep Schedule: unchanged  Naps: denies    Avg TST per 24 hours: 7-8 hours    Sleep-Related Details:  Preferred Sleep Position: side(s)  SDB Symptoms: she is unsure about snoring, witnessed apneas or gasping/choking in sleep currently as she sleeps alone.  She denies dry mouth or waking unrefreshed currently, but has had this in the past  Bruxism: No  Nocturnal GERD: mostly during the day, not a lot of nighttime symptoms  Nocturnal Palpitations: No  Sleep-Related Hallucinations: No   Sleep Paralysis: No   Cataplexy: No     She denies having an urge to move the legs in the evening (when resting) nor uncomfortable and/or unpleasant sensations in the legs. She has not been told that she kicks her legs during sleep.  She does describe sensations closer to neuropathy at night.  Occasionally she has had nocturnal leg cramps.    She denies any parasomnia activity.  She has been told in the past that she talks in her sleep.    Wake-Related Details  Daytime Sleepiness: Yes, historically has had daytime sleepiness, this had been better recently with increased daytime activity.  She does still feel sleepy at times.    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    Past Treatments:  CPAP (unknown settings)    Prior Sleep Studies:  Reports prior testing, thinks initially severe with most recent testing showing  moderate sleep apnea.    Other Relevant Labs and Studies:  None    Past Medical History:   Diagnosis Date    Arthritis 1985    Chronic kidney disease 03/2023    Depression 1986    Dysphagia, unspecified 01/31/2022    GERD (gastroesophageal reflux disease)     Don't remember    Headache(784.0) 1986    Migraine    Hyperlipidemia     Memory loss     Visual impairment      Past Surgical History:   Procedure Laterality Date    BREAST SURGERY  06/1986    Bilat reduction    EYE SURGERY  cataracts Aug & Sep 2022    FRACTURE SURGERY  L wrist  Apr 2017    HIP SURGERY  both replaced, L revised    JOINT REPLACEMENT  TKR (R) 1999, 2012, 2022    Both hips    KNEE SURGERY  R 02/1975,    SPINE SURGERY  cervical 2005, lumbar 2012     Patient Active Problem List   Diagnosis    Attention deficit disorder (ADD) without hyperactivity    Chronic renal failure, stage 3a (HCC)    Depressive disorder    Fibromyalgia    Hiatal hernia with gastroesophageal reflux    Hyperlipidemia    Obstructive sleep apnea of adult    Osteoarthritis    Pre-diabetes    Posttraumatic stress disorder    Vitamin D deficiency    Obesity (BMI 30-39.9)    Chronic right shoulder pain    Blepharospasm    Chronic cough    Memory changes    Overactive bladder     Allergies as of 03/13/2025 - Reviewed 03/13/2025   Allergen Reaction Noted    Gabapentin Other (See Comments) 01/31/2022    Other Swelling 01/31/2022    Pregabalin Other (See Comments) 01/31/2022    Statins Arthralgia and Myalgia 01/31/2022    Medical tape Rash 01/31/2022     REVIEW OF SYSTEMS:  Review of Systems  10-point system review completed, all of which are negative except as mentioned above.    CURRENT MEDICATIONS:  Current Outpatient Medications   Medication Instructions    ACETYLCYSTEINE IN INSTILL 1 DROP IN EACH EYE FOUR TIMES A DAY FOR DRY EYES    atoMOXetine (STRATTERA) 40 mg, Oral, Daily    buPROPion (WELLBUTRIN XL) 150 mg, Oral, Every morning    Cholecalciferol 50 MCG (2000 UT) CAPS 1 capsule,  "Daily    Cyanocobalamin (VITAMIN B-12 PO) Take by mouth    DULoxetine (CYMBALTA) 60 mg, Oral, Daily    ezetimibe (ZETIA) 10 mg, Oral, Daily    fluticasone (FLONASE) 50 mcg/act nasal spray 1 spray, Nasal, Daily at bedtime    meloxicam (MOBIC) 15 mg tablet 1 tablet, Every morning    nystatin (MYCOSTATIN) powder 1 Application, Apply externally, Daily    pantoprazole (PROTONIX) 40 mg, Daily     SOCIAL HISTORY:  Social History     Tobacco Use    Smoking status: Never    Smokeless tobacco: Never   Vaping Use    Vaping status: Never Used   Substance Use Topics    Alcohol use: Not Currently     Comment: maybe one during holidays    Drug use: Never     FAMILY HISTORY:  Family History   Problem Relation Age of Onset    Coronary artery disease Mother          Mar 1989    Diabetes Mother         Type 2    Arthritis Father     Vision loss Father         Macular Degeneration    Coronary artery disease Brother          2020    Diabetes Brother         Type 2    Diabetes Brother         Type 2    Cancer Brother         Leukemia       Family History of Sleep Disorders: denies    PHYSICAL EXAMINATION:  Vital Signs:  /78 (BP Location: Left arm, Patient Position: Sitting, Cuff Size: Standard)   Pulse 86   Ht 5' 1\" (1.549 m)   Wt 91.2 kg (201 lb)   SpO2 96%   BMI 37.98 kg/m²   Body mass index is 37.98 kg/m².    Constitutional: NAD, well appearing, obese   Mental Status: AAOx3  Neck Circumference: Neck Circumference: 15 inches  Skin: Warm, dry, no rashes noted   Eyes: PERRL, normal conjunctiva  ENT: Nasal congestion absent, nasal valve incompetence absent.  Posterior Airspace:   [place] Tongue Position: 4  Retrognathia: absent  Overbite: absent  High Arched Palate: absent  Tongue Scalloping/Ridging: absent  Tonsils: not visualized  Uvula: normal  Chest: RRR, +S1/S2, CTA B/L, no W/R/R, no M/R/G   Extremities: No digital clubbing or pedal edema      Marcia Mahoney MD  Pulmonary-Critical Care and Sleep " "Medicine  03/13/25    Portions of the record may have been created with voice recognition software. Occasional wrong word or \"sound a like\" substitutions may have occurred due to the inherent limitations of voice recognition software. Please read the chart carefully and recognize, using context, where substitutions have occurred.   "

## 2025-04-03 ENCOUNTER — CONSULT (OUTPATIENT)
Dept: NEPHROLOGY | Facility: CLINIC | Age: 80
End: 2025-04-03

## 2025-04-03 VITALS
SYSTOLIC BLOOD PRESSURE: 126 MMHG | BODY MASS INDEX: 37.6 KG/M2 | HEART RATE: 91 BPM | DIASTOLIC BLOOD PRESSURE: 80 MMHG | WEIGHT: 199 LBS | OXYGEN SATURATION: 97 %

## 2025-04-03 DIAGNOSIS — R80.9 PROTEINURIA, UNSPECIFIED TYPE: ICD-10-CM

## 2025-04-03 DIAGNOSIS — N18.9 CHRONIC KIDNEY DISEASE-MINERAL AND BONE DISORDER (CKD-MBD): ICD-10-CM

## 2025-04-03 DIAGNOSIS — E66.9 OBESITY (BMI 30-39.9): ICD-10-CM

## 2025-04-03 DIAGNOSIS — M89.9 CHRONIC KIDNEY DISEASE-MINERAL AND BONE DISORDER (CKD-MBD): ICD-10-CM

## 2025-04-03 DIAGNOSIS — R73.03 PRE-DIABETES: ICD-10-CM

## 2025-04-03 DIAGNOSIS — E78.2 MIXED HYPERLIPIDEMIA: ICD-10-CM

## 2025-04-03 DIAGNOSIS — N18.31 CHRONIC RENAL FAILURE, STAGE 3A (HCC): ICD-10-CM

## 2025-04-03 DIAGNOSIS — E83.9 CHRONIC KIDNEY DISEASE-MINERAL AND BONE DISORDER (CKD-MBD): ICD-10-CM

## 2025-04-03 DIAGNOSIS — N18.31 STAGE 3A CHRONIC KIDNEY DISEASE (HCC): Primary | ICD-10-CM

## 2025-04-03 NOTE — ASSESSMENT & PLAN NOTE
Encouraged patient to follow a renal diet comprising of moderate potassium, low phosphorus and protein restriction to 0.8gm/kg.  Dietary handouts provided  Encouraged weight loss.  Will check serum albumin with next blood work.   Orders:  •  Renal function panel; Future  •  PTH, intact; Future  •  Protein electrophoresis, urine; Future  •  Protein electrophoresis, serum; Future  •  Immunoglobulin free LT chains blood; Future  •  Albumin / creatinine urine ratio; Future  •  Phosphorus; Future  •  Urinalysis with microscopic; Future  •  Vitamin D 25 hydroxy; Future  •  Renal function panel; Future  •  PTH, intact; Future  •  Albumin / creatinine urine ratio; Future  •  Phosphorus; Future  •  Magnesium; Future  •  Vitamin D 25 hydroxy; Future  •  US kidney and bladder; Future

## 2025-04-03 NOTE — PROGRESS NOTES
Name: Gina Andersen      : 1945      MRN: [id number]  Encounter Provider: Swathi Wilson MD  Encounter Date: 4/3/2025   Encounter department: [facility]  : 79 y.o. female with history of multiple comorbidities including ADD, PTSD, OA, obesity, prediabetes (x30yrs+), hyperlipidemia and chronic pain presents to the office for evaluation and management of abnormal renal parameters.     Assessment & Plan  Stage 3a chronic kidney disease (HCC)  Lab Results   Component Value Date    EGFR 57 2024    EGFR 50 2024    EGFR 50 2023    CREATININE 0.95 2024    CREATININE 1.07 2024    CREATININE 1.07 2023     BP Readings from Last 3 Encounters:   25 126/80   25 118/78   24 128/86     Current medications: none  Changes made today: None discussed techniques with patient with regards to checking blood pressures advised patient to call with blood pressure updates increase she may benefit from addition of low-dose ACE inhibitor or ARB  Goal BP of < 130/80 based on age and comorbidities  Instructed to follow low sodium (2gm)diet.  May consider addition of ACE inhibitor or ARB or SGLT2 inhibitor at next visit  after review of records In [facility] as well as Care everywhere patient has a baseline creatinine of 0.9-1.1 mg/dL dating as far back as .   Most recent labs show a Creatinine of 0.95 mg/dL on 2024. Renal function remains stable.  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  Will still rule out paraproteinemia check SPEP UPEP free light chain ratio.  Will rule out cystic disease  Had a detailed discussion with the patient with regards to limit NSAID usage however she reports that she is very dependent on them and has tried other agents and has not  been successful.  Understands risks and benefits of being on NSAIDs  Imaging:   Check renal ultrasound to rule out masses  Recommend to avoid use of NSAIDs, nephrotoxins. Caution advised with regards to exposure to IV contrast dye.   Discussed with the patient in depth her renal status, including the possible etiologies for CKD.   Advised the patient that when her GFR is close to 20mL/min then will start discussing about RRT(renal replacement therapy) options such as renal transplant, peritoneal dialysis and hemodialysis.   Informed the patient about the various options for Renal Replacement therapy.  Discussed with the patient how we need to work together to delay the progression of CKD with optimal BP control based on their age and co-morbidities, optimal BS control with HbA1c of <7% and trying to reduce proteinuria by the use of anti-proteinuric agents.   CKD education/Kidney smart: Will refer at next visit  Follow-up: Patient is to follow-up in 4 months, with lab work to be performed after the visit and then again in a few days prior to the next visit. Advised patient to call me in 10 days to review the results if they do not hear back from me, as I may have not received the results.    Orders:  •  Renal function panel; Future  •  PTH, intact; Future  •  Protein electrophoresis, urine; Future  •  Protein electrophoresis, serum; Future  •  Immunoglobulin free LT chains blood; Future  •  Albumin / creatinine urine ratio; Future  •  Phosphorus; Future  •  Urinalysis with microscopic; Future  •  Vitamin D 25 hydroxy; Future  •  Renal function panel; Future  •  PTH, intact; Future  •  Albumin / creatinine urine ratio; Future  •  Phosphorus; Future  •  Magnesium; Future  •  Vitamin D 25 hydroxy; Future  •  US kidney and bladder; Future    Chronic kidney disease-mineral and bone disorder (CKD-MBD)  Lab Results   Component Value Date    EGFR 57 12/18/2024    EGFR 50 05/29/2024    EGFR 50 09/01/2023    CREATININE 0.95  12/18/2024    CREATININE 1.07 05/29/2024    CREATININE 1.07 09/01/2023     Based on patients CKD stage following is the goal of therapy.  Maintain calcium phosphorus product of < 55.  Stage 3 CKD - Goal Ca 8.5-10 mg/dL , goal Phos 2.7-4.6 mg/dL  , goal iPTH 30-70 pg/m  Currently on: Vitamin D 2000 units p.o. daily  Changes made today: None  most recent ipth not available and vit D 38 as of 12/18/2024  Check iPTH vitamin D prior to next visit and after today's visit    Pre-diabetes  Reports has had prediabetes for more than 10 years  Most recent A1c at 6.3% as of 12/18/2024  Hemoglobin A1c as high as 6.3% in December 2024  Advised patient of tight glycemic control as that will help delay CKD progression  Currently on: No medications may need to consider just addition of SGLT2 inhibitors to help delay CKD progression    Proteinuria, unspecified type  likely has underlying proteinuria due to diabetic kidney disease plus obesity related hyperfiltration plus some component of NSAID usage will still rule out paraproteinemia check SPEP UPEP free light chain ratio  most recent MACR is 6 mg/dL as of December 2024  Recheck MACR prior to next visit  For proteinuria reduction continue on vitamin D  Mixed hyperlipidemia  Goal LDL less than 70  Continue on Zetia  Obesity (BMI 30-39.9)  Encouraged patient to follow a renal diet comprising of moderate potassium, low phosphorus and protein restriction to 0.8gm/kg.  Dietary handouts provided  Encouraged weight loss.  Will check serum albumin with next blood work.   Orders:  •  Renal function panel; Future  •  PTH, intact; Future  •  Protein electrophoresis, urine; Future  •  Protein electrophoresis, serum; Future  •  Immunoglobulin free LT chains blood; Future  •  Albumin / creatinine urine ratio; Future  •  Phosphorus; Future  •  Urinalysis with microscopic; Future  •  Vitamin D 25 hydroxy; Future  •  Renal function panel; Future  •  PTH, intact; Future  •  Albumin / creatinine  urine ratio; Future  •  Phosphorus; Future  •  Magnesium; Future  •  Vitamin D 25 hydroxy; Future  •  US kidney and bladder; Future        History of Present Illness   HPI  Gina Andersen is a 79 y.o. female with history of multiple comorbidities including ADD, PTSD, OA, obesity, prediabetes (x30yrs+), hyperlipidemia and chronic pain presents to the office for evaluation and management of abnormal renal parameters. Been mobic for years. In the past have used other NSAIDs too.  Never seen a nephrologist before no history of GISELA needing dialysis no history of kidney stones thankful for the care information that she is gone today has been dependent on NSAIDs unfortunately cannot stop taking them has tried other medications such as Lyrica and gabapentin with adverse effects.  Has seen rheumatology also in the past.  Understands risks involved with taking NSAIDs.  Reports more than 40 years ago she had some issue with her kidney when she returned back from [place] and was just told to hydrate herself nothing else does not recall exact details.      History obtained from: patient    Review of Systems   Constitutional:  Negative for chills and fever.   HENT:  Positive for postnasal drip. Negative for congestion.    Respiratory:  Positive for cough. Negative for shortness of breath and wheezing.    Cardiovascular:  Negative for leg swelling.   Gastrointestinal:  Negative for constipation and diarrhea.   Genitourinary:  Negative for difficulty urinating, dysuria and hematuria.   Musculoskeletal:  Negative for back pain.   Neurological:  Negative for dizziness, light-headedness and headaches.   Psychiatric/Behavioral:  Negative for agitation and confusion.    All other systems reviewed and are negative.    Medical History Reviewed by provider this encounter:  Tobacco  Allergies  Meds  Problems  Med Hx  Surg Hx  Fam Hx  Soc   Hx    .  Past Medical History   Past Medical History:   Diagnosis Date   • Arthritis 1985  "  • Chronic kidney disease 2023   • Depression    • Dysphagia, unspecified 2022   • GERD (gastroesophageal reflux disease)     Don't remember   • Headache(784.0)     Migraine   • Hyperlipidemia    • Memory loss    • Visual impairment      Past Surgical History:   Procedure Laterality Date   • BREAST SURGERY  1986    Bilat reduction   • EYE SURGERY  cataracts Aug & Sep 2022   • FRACTURE SURGERY  L wrist  2017   • HIP SURGERY  both replaced, L revised   • JOINT REPLACEMENT  TKR (R) , ,     Both hips   • KNEE SURGERY  R 1975,   • SPINE SURGERY  cervical , lumbar      Family History   Problem Relation Age of Onset   • Coronary artery disease Mother          Mar 1989   • Diabetes Mother         Type 2   • Arthritis Father    • Vision loss Father         Macular Degeneration   • Coronary artery disease Brother          2020   • Diabetes Brother         Type 2   • Diabetes Brother         Type 2   • Cancer Brother         Leukemia        reports that she has never smoked. She has never used smokeless tobacco. She reports that she does not currently use alcohol. She reports that she does not use drugs.  Current Outpatient Medications   Medication Instructions   • ACETYLCYSTEINE IN INSTILL 1 DROP IN EACH EYE FOUR TIMES A DAY FOR DRY EYES   • atoMOXetine (STRATTERA) 40 mg, Oral, Daily   • buPROPion (WELLBUTRIN XL) 150 mg, Oral, Every morning   • Cholecalciferol 50 MCG ( UT) CAPS 1 capsule, Daily   • Cyanocobalamin (VITAMIN B-12 PO) Take by mouth   • DULoxetine (CYMBALTA) 60 mg, Oral, Daily   • ezetimibe (ZETIA) 10 mg, Oral, Daily   • fluticasone (FLONASE) 50 mcg/act nasal spray 1 spray, Nasal, Daily at bedtime   • meloxicam (MOBIC) 15 mg tablet 1 tablet, Every morning   • nystatin (MYCOSTATIN) powder 1 Application, Daily   • pantoprazole (PROTONIX) 40 mg, Daily     Allergies   Allergen Reactions   • Gabapentin Other (See Comments)     \"out of body " "experience\"  \"Out of body experience\"     • Other Swelling     SURGICAL STEEL  pain   • Pregabalin Other (See Comments)     Blurred vision, off balance to the point point of falling   • Statins Arthralgia and Myalgia     Severe arthralgias   • Medical Tape Rash     welts        Current Outpatient Medications on File Prior to Visit   Medication Sig Dispense Refill   • ACETYLCYSTEINE IN INSTILL 1 DROP IN EACH EYE FOUR TIMES A DAY FOR DRY EYES     • atoMOXetine (STRATTERA) 40 mg capsule Take 1 capsule (40 mg total) by mouth daily 90 capsule 0   • buPROPion (WELLBUTRIN XL) 150 mg 24 hr tablet Take 1 tablet (150 mg total) by mouth every morning 90 tablet 3   • Cholecalciferol 50 MCG (2000 UT) CAPS Take 1 capsule by mouth in the morning     • Cyanocobalamin (VITAMIN B-12 PO) Take by mouth     • DULoxetine (CYMBALTA) 60 mg delayed release capsule Take 1 capsule (60 mg total) by mouth daily 90 capsule 1   • ezetimibe (ZETIA) 10 mg tablet Take 1 tablet (10 mg total) by mouth daily 90 tablet 3   • fluticasone (FLONASE) 50 mcg/act nasal spray 1 spray into each nostril daily at bedtime 9.9 mL 3   • meloxicam (MOBIC) 15 mg tablet Take 1 tablet by mouth every morning     • nystatin (MYCOSTATIN) powder Apply 1 Application topically daily     • pantoprazole (PROTONIX) 40 mg tablet Take 40 mg by mouth daily       No current facility-administered medications on file prior to visit.      Social History     Tobacco Use   • Smoking status: Never   • Smokeless tobacco: Never   Vaping Use   • Vaping status: Never Used   Substance and Sexual Activity   • Alcohol use: Not Currently     Comment: maybe one during holidays   • Drug use: Never   • Sexual activity: Not Currently     Partners: Male     Birth control/protection: Abstinence, Condom Male        Objective   /80 (BP Location: Left arm, Patient Position: Sitting, Cuff Size: Adult)   Pulse 91   Wt 90.3 kg (199 lb)   SpO2 97%   BMI 37.60 kg/m²      Physical Exam  Vitals " reviewed.   Constitutional:       General: She is not in acute distress.     Appearance: Normal appearance. She is obese. She is not ill-appearing, toxic-appearing or diaphoretic.   HENT:      Head: Normocephalic and atraumatic.      Mouth/Throat:      Mouth: Mucous membranes are moist.      Pharynx: No oropharyngeal exudate.   Eyes:      General: No scleral icterus.  Cardiovascular:      Rate and Rhythm: Normal rate.   Pulmonary:      Effort: No respiratory distress.      Breath sounds: Normal breath sounds. No stridor. No wheezing.   Abdominal:      General: There is no distension.      Palpations: There is no mass.      Tenderness: There is no right CVA tenderness or left CVA tenderness.   Musculoskeletal:         General: No swelling.      Cervical back: Normal range of motion. No rigidity.   Skin:     Coloration: Skin is not jaundiced.   Neurological:      General: No focal deficit present.      Mental Status: She is alert and oriented to person, place, and time.   Psychiatric:         Mood and Affect: Mood normal.         Behavior: Behavior normal.

## 2025-04-03 NOTE — ASSESSMENT & PLAN NOTE
Lab Results   Component Value Date    EGFR 57 12/18/2024    EGFR 50 05/29/2024    EGFR 50 09/01/2023    CREATININE 0.95 12/18/2024    CREATININE 1.07 05/29/2024    CREATININE 1.07 09/01/2023     Based on patients CKD stage following is the goal of therapy.  Maintain calcium phosphorus product of < 55.  Stage 3 CKD - Goal Ca 8.5-10 mg/dL , goal Phos 2.7-4.6 mg/dL  , goal iPTH 30-70 pg/m  Currently on: Vitamin D 2000 units p.o. daily  Changes made today: None  most recent ipth not available and vit D 38 as of 12/18/2024  Check iPTH vitamin D prior to next visit and after today's visit

## 2025-04-03 NOTE — ASSESSMENT & PLAN NOTE
Reports has had prediabetes for more than 10 years  Most recent A1c at 6.3% as of 12/18/2024  Hemoglobin A1c as high as 6.3% in December 2024  Advised patient of tight glycemic control as that will help delay CKD progression  Currently on: No medications may need to consider just addition of SGLT2 inhibitors to help delay CKD progression

## 2025-04-03 NOTE — PATIENT INSTRUCTIONS
"Get blood work after the visit  Get kidney ultrasound done  Stop mobic if possible or find a substitute  Please call me in 10 days after having your blood work done to review the results if you do not hear back from me or my office, as I may have not received the results.  please remember to perform blood work prior to the next visit.  Please call if the blood pressure top number is greater than 140 or less than 110 consistently.  Please call if you are gaining more than 2lbs in 2 days for adjustment of water pills.  ~ Please AVOID the following pain medications.  LIST OF NSAIDS (NONSTEROIDAL ANTI-INFLAMMATORY DRUGS) AND MARTÍNEZ-2 INHIBITORS    DIFLUNISAL (DOLOBID)  IBUPROFEN (MOTRIN, ADVIL)  FLURBIPROFEN (ANSAID)  KETOPROFEN (ORUDIS, ORUVAIL)  FENOPROFEN (NALFON)  NABUMETONE (RELAFEN)  PIROXICAM (FELDENE)  NAPROXEN (ALEVE, NAPROSYN, NAPRELAN, ANAPROX)  DICLOFENAC (VOLTAREN, CATAFLAM)  INDOMETHACIN (INDOCIN)  SULINDAC (CLINORIL)  TOLMETIN (TOLETIN)  ETODOLAC (LODINE)  MELOXICAM (MOBIC)  KETOROLAC (TORADOL)  OXAPROZIN (DAYPRO)  CELECOXIB (CELEBREX)  Phosphorus diet  Follow a low phosphorus diet.    Avoid these higher phosphorus foods: Choose these lower phosphorus foods:   Milk, pudding or yogurt (from animals and from many soy varieties) Rice milk (unfortified), nondairy creamer (if it doesn't have terms in the ingredients list that contain the letters \"phos\")   Hard cheeses, ricotta or cottage cheese, fat-free cream cheese Regular and low-fat cream cheese   Ice cream or frozen yogurt Sherbet or frozen fruit pops   Soups made with higher phosphorus ingredients (milk, dried peas, beans, lentils) Soups made with lower phosphorus ingredients (broth- or water-based with other lower phosphorus ingredients)   Whole grains, including whole-grain breads, crackers, cereal, rice and pasta Refined grains, including white bread, crackers, cereals, rice and pasta   Quick breads, biscuits, cornbread, muffins, pancakes or waffles " "Homemade refined (white) dinner rolls, bagels or English muffins   Dried peas (split, black-eyed), beans (black, garbanzo, lima, kidney, navy, mathews) or lentils Green peas (canned, frozen), green beans or wax beans   Organ meats, walleye, pollock or sardines Lean beef, pork, lamb, poultry or other fish   Nuts and seeds Popcorn   Peanut butter and other nut butters Jam, jelly or honey   Chocolate, including chocolate drinks Carob (chocolate-flavored) candy, hard candy or gumdrops   David and pepper-type sodas, flavored cabral, bottled teas (if a term in the ingredients list contains the letters \"phos\") Lemon-lime soda, ginger ale or root beer, plain water   Follow a moderate potassium diet.      Exercise to Lose Weight     Exercise and a healthy diet may help you lose weight. Your doctor may suggest specific exercises.     EXERCISE IDEAS AND TIPS     Choose low-cost things you enjoy doing, such as walking, bicycling, or exercising to workout videos.   Take stairs instead of the elevator.   Walk during your lunch break.   Park your car further away from work or school.   Go to a gym or an exercise class.   Start with 5 to 10 minutes of exercise each day. Build up to 30 minutes of exercise 4 to 6 days a week.   Wear shoes with good support and comfortable clothes.   Stretch before and after working out.   Work out until you breathe harder and your heart beats faster.   Drink extra water when you exercise.   Do not do so much that you hurt yourself, feel dizzy, or get very short of breath.     Exercises that burn about 150 calories:   Running 1 ½ miles in 15 minutes.   Playing volleyball for 45 to 60 minutes.   Washing and waxing a car for 45 to 60 minutes.   Playing touch football for 45 minutes.   Walking 1 ¾ miles in 35 minutes.   Pushing a stroller 1 ½ miles in 30 minutes.   Playing basketball for 30 minutes.   Raking leaves for 30 minutes.   Bicycling 5 miles in 30 minutes.   Walking 2 miles in 30 minutes. "   Dancing for 30 minutes.   Shoveling snow for 15 minutes.   Swimming laps for 20 minutes.   Walking up stairs for 15 minutes.   Bicycling 4 miles in 15 minutes.   Gardening for 30 to 45 minutes.   Jumping rope for 15 minutes.   Washing windows or floors for 45 to 60 minutes.

## 2025-04-03 NOTE — ASSESSMENT & PLAN NOTE
Lab Results   Component Value Date    EGFR 57 12/18/2024    EGFR 50 05/29/2024    EGFR 50 09/01/2023    CREATININE 0.95 12/18/2024    CREATININE 1.07 05/29/2024    CREATININE 1.07 09/01/2023     BP Readings from Last 3 Encounters:   04/03/25 126/80   03/13/25 118/78   12/23/24 128/86     Current medications: none  Changes made today: None discussed techniques with patient with regards to checking blood pressures advised patient to call with blood pressure updates increase she may benefit from addition of low-dose ACE inhibitor or ARB  Goal BP of < 130/80 based on age and comorbidities  Instructed to follow low sodium (2gm)diet.  May consider addition of ACE inhibitor or ARB or SGLT2 inhibitor at next visit  after review of records In [facility] as well as Care everywhere patient has a baseline creatinine of 0.9-1.1 mg/dL dating as far back as 2022.   Most recent labs show a Creatinine of 0.95 mg/dL on 12/18/2024. Renal function remains stable.  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  Will still rule out paraproteinemia check SPEP UPEP free light chain ratio.  Will rule out cystic disease  Had a detailed discussion with the patient with regards to limit NSAID usage however she reports that she is very dependent on them and has tried other agents and has not been successful.  Understands risks and benefits of being on NSAIDs  Imaging:   Check renal ultrasound to rule out masses  Recommend to avoid use of NSAIDs, nephrotoxins. Caution advised with regards to exposure to IV contrast dye.   Discussed with the patient in depth her renal status, including the possible etiologies for CKD.   Advised the patient that when her GFR is close to 20mL/min then will start discussing about RRT(renal replacement therapy) options such as renal transplant,  peritoneal dialysis and hemodialysis.   Informed the patient about the various options for Renal Replacement therapy.  Discussed with the patient how we need to work together to delay the progression of CKD with optimal BP control based on their age and co-morbidities, optimal BS control with HbA1c of <7% and trying to reduce proteinuria by the use of anti-proteinuric agents.   CKD education/Kidney smart: Will refer at next visit  Follow-up: Patient is to follow-up in 4 months, with lab work to be performed after the visit and then again in a few days prior to the next visit. Advised patient to call me in 10 days to review the results if they do not hear back from me, as I may have not received the results.    Orders:  •  Renal function panel; Future  •  PTH, intact; Future  •  Protein electrophoresis, urine; Future  •  Protein electrophoresis, serum; Future  •  Immunoglobulin free LT chains blood; Future  •  Albumin / creatinine urine ratio; Future  •  Phosphorus; Future  •  Urinalysis with microscopic; Future  •  Vitamin D 25 hydroxy; Future  •  Renal function panel; Future  •  PTH, intact; Future  •  Albumin / creatinine urine ratio; Future  •  Phosphorus; Future  •  Magnesium; Future  •  Vitamin D 25 hydroxy; Future  •  US kidney and bladder; Future

## 2025-04-10 ENCOUNTER — OFFICE VISIT (OUTPATIENT)
Dept: CARDIOLOGY CLINIC | Facility: CLINIC | Age: 80
End: 2025-04-10
Payer: MEDICARE

## 2025-04-10 VITALS
HEIGHT: 61 IN | SYSTOLIC BLOOD PRESSURE: 120 MMHG | HEART RATE: 92 BPM | BODY MASS INDEX: 37.38 KG/M2 | WEIGHT: 198 LBS | DIASTOLIC BLOOD PRESSURE: 70 MMHG

## 2025-04-10 DIAGNOSIS — E78.2 MIXED HYPERLIPIDEMIA: Primary | ICD-10-CM

## 2025-04-10 PROCEDURE — 99214 OFFICE O/P EST MOD 30 MIN: CPT | Performed by: INTERNAL MEDICINE

## 2025-04-10 PROCEDURE — 93000 ELECTROCARDIOGRAM COMPLETE: CPT | Performed by: INTERNAL MEDICINE

## 2025-04-10 NOTE — PROGRESS NOTES
"      Cardiology             Gina Andersen  1945  [id number]              Assessment/Plan:    Dyslipidemia, uncontrolled  Prediabetes  CKD        Dyslipidemia improved with LDL cholesterol improving from 199 mg/dL down to 118 mg/dL.  Continue ezetimibe.  Heartedly diet, exercise, weight loss has been strongly recommended.        Follow up with PCP      Interval History:     This is a 79-year-old female with no prior cardiac history.  She has severe osteoarthritis, longstanding dyslipidemia, CKD, and prediabetes.    She has been referred to lipid clinic for uncontrolled dyslipidemia.  LDL cholesterol on 5/29/2024 was significant elevated at 199 mg/dL with level prior to that in 2023 of 177 mg/dL.  She has been reported to have statin intolerance, and states has had tried Lipitor and simvastatin in the past resulting in severe arthralgias.    She attempts to eat a healthy diet, not exercising regularly.    She was seen for initial consultation on 8/9/2025 which time she was initiated on ezetimibe.  Repeat lipid panel on 12/18/2024 showed improvement in LDL cholesterol from 199 mg/dL down to 118 mg/dL.    She presents today for follow-up with no complaints.                 Vitals:  Vitals:    04/10/25 1042   BP: 120/70   Pulse: 92   Weight: 89.8 kg (198 lb)   Height: 5' 1\" (1.549 m)         Past Medical History:   Diagnosis Date   • Arthritis 1985   • Chronic kidney disease 03/2023   • Depression 1986   • Dysphagia, unspecified 01/31/2022   • GERD (gastroesophageal reflux disease)     Don't remember   • Headache(784.0) 1986    Migraine   • Hyperlipidemia    • Memory loss    • Visual impairment      Social History     Socioeconomic History   • Marital status: Single     Spouse name: Not on file   • Number of children: Not on file   • Years of education: Not on file   • Highest education level: Not on file   Occupational History   • Not on file   Tobacco Use   • Smoking status: Never   • Smokeless tobacco: " Never   Vaping Use   • Vaping status: Never Used   Substance and Sexual Activity   • Alcohol use: Not Currently     Comment: maybe one during holidays   • Drug use: Never   • Sexual activity: Not Currently     Partners: Male     Birth control/protection: Abstinence, Condom Male   Other Topics Concern   • Not on file   Social History Narrative   • Not on file     Social Drivers of Health     Financial Resource Strain: Low Risk  (2023)    Overall Financial Resource Strain (CARDIA)    • Difficulty of Paying Living Expenses: Not hard at all   Food Insecurity: No Food Insecurity (2024)    Hunger Vital Sign    • Worried About Running Out of Food in the Last Year: Never true    • Ran Out of Food in the Last Year: Never true   Transportation Needs: No Transportation Needs (2024)    PRAPARE - Transportation    • Lack of Transportation (Medical): No    • Lack of Transportation (Non-Medical): No   Physical Activity: Not on file   Stress: Not on file   Social Connections: Not on file   Intimate Partner Violence: Not on file   Housing Stability: Low Risk  (2024)    Housing Stability Vital Sign    • Unable to Pay for Housing in the Last Year: No    • Number of Times Moved in the Last Year: 0    • Homeless in the Last Year: No      Family History   Problem Relation Age of Onset   • Coronary artery disease Mother          Mar 1989   • Diabetes Mother         Type 2   • Arthritis Father    • Vision loss Father         Macular Degeneration   • Coronary artery disease Brother          2020   • Diabetes Brother         Type 2   • Diabetes Brother         Type 2   • Cancer Brother         Leukemia       Past Surgical History:   Procedure Laterality Date   • BREAST SURGERY  1986    Bilat reduction   • EYE SURGERY  cataracts Aug & Sep 2022   • FRACTURE SURGERY  L wrist  2017   • HIP SURGERY  both replaced, L revised   • JOINT REPLACEMENT  TKR (R) , ,     Both hips   • KNEE  SURGERY  R 02/1975,   • SPINE SURGERY  cervical 2005, lumbar 2012       Current Outpatient Medications:   •  atoMOXetine (STRATTERA) 40 mg capsule, Take 1 capsule (40 mg total) by mouth daily, Disp: 90 capsule, Rfl: 0  •  buPROPion (WELLBUTRIN XL) 150 mg 24 hr tablet, Take 1 tablet (150 mg total) by mouth every morning, Disp: 90 tablet, Rfl: 3  •  Cholecalciferol 50 MCG (2000 UT) CAPS, Take 1 capsule by mouth in the morning, Disp: , Rfl:   •  Cyanocobalamin (VITAMIN B-12 PO), Take by mouth, Disp: , Rfl:   •  DULoxetine (CYMBALTA) 60 mg delayed release capsule, Take 1 capsule (60 mg total) by mouth daily, Disp: 90 capsule, Rfl: 1  •  ezetimibe (ZETIA) 10 mg tablet, Take 1 tablet (10 mg total) by mouth daily, Disp: 90 tablet, Rfl: 3  •  fluticasone (FLONASE) 50 mcg/act nasal spray, 1 spray into each nostril daily at bedtime, Disp: 9.9 mL, Rfl: 3  •  meloxicam (MOBIC) 15 mg tablet, Take 1 tablet by mouth every morning, Disp: , Rfl:   •  nystatin (MYCOSTATIN) powder, Apply 1 Application topically daily, Disp: , Rfl:   •  pantoprazole (PROTONIX) 40 mg tablet, Take 40 mg by mouth daily, Disp: , Rfl:   •  PROPYLENE GLYCOL OP, Apply to eye INSTILL 1 DROP IN EACH EYE FOUR TIMES A DAY FOR DRY EYES, Disp: , Rfl:         Review of Systems:  Review of Systems   Constitutional:  Negative for activity change, fever and unexpected weight change.   HENT:  Negative for facial swelling, nosebleeds and voice change.    Respiratory:  Negative for chest tightness, shortness of breath and wheezing.    Cardiovascular:  Negative for chest pain, palpitations and leg swelling.   Gastrointestinal:  Negative for abdominal distention.   Genitourinary:  Negative for hematuria.   Musculoskeletal:  Negative for arthralgias.   Skin:  Negative for color change, pallor, rash and wound.   Neurological:  Negative for dizziness, seizures and syncope.   Psychiatric/Behavioral:  Negative for agitation.          Physical Exam:  Physical Exam  Vitals  reviewed.   Constitutional:       Appearance: She is well-developed. She is obese.   HENT:      Head: Normocephalic and atraumatic.   Cardiovascular:      Rate and Rhythm: Normal rate and regular rhythm.      Heart sounds: Normal heart sounds.   Pulmonary:      Effort: Pulmonary effort is normal.      Breath sounds: Normal breath sounds.   Abdominal:      Palpations: Abdomen is soft.   Musculoskeletal:         General: Normal range of motion.      Cervical back: Normal range of motion and neck supple.   Skin:     General: Skin is warm and dry.   Neurological:      Mental Status: She is alert and oriented to person, place, and time.   Psychiatric:         Behavior: Behavior normal.         Thought Content: Thought content normal.         Judgment: Judgment normal.         This note was completed in part utilizing M-[employer] Flu[employer] Direct Software.  Grammatical errors, random word insertions, spelling mistakes, and incomplete sentences can be an occasional consequence of this system secondary to software limitations, ambient noise, and hardware issues.  If you have any questions or concerns about the content, text, or information contained within the body of this dictation, please contact the provider for clarification.

## 2025-04-16 ENCOUNTER — HOSPITAL ENCOUNTER (OUTPATIENT)
Dept: ULTRASOUND IMAGING | Facility: MEDICAL CENTER | Age: 80
Discharge: HOME | End: 2025-04-16
Payer: MEDICARE

## 2025-04-16 DIAGNOSIS — N18.9 CHRONIC KIDNEY DISEASE-MINERAL AND BONE DISORDER (CKD-MBD): ICD-10-CM

## 2025-04-16 DIAGNOSIS — M89.9 CHRONIC KIDNEY DISEASE-MINERAL AND BONE DISORDER (CKD-MBD): ICD-10-CM

## 2025-04-16 DIAGNOSIS — R73.03 PRE-DIABETES: ICD-10-CM

## 2025-04-16 DIAGNOSIS — E83.9 CHRONIC KIDNEY DISEASE-MINERAL AND BONE DISORDER (CKD-MBD): ICD-10-CM

## 2025-04-16 DIAGNOSIS — N18.31 STAGE 3A CHRONIC KIDNEY DISEASE (HCC): ICD-10-CM

## 2025-04-16 DIAGNOSIS — E66.9 OBESITY (BMI 30-39.9): ICD-10-CM

## 2025-04-16 DIAGNOSIS — E78.2 MIXED HYPERLIPIDEMIA: ICD-10-CM

## 2025-04-16 DIAGNOSIS — R80.9 PROTEINURIA, UNSPECIFIED TYPE: ICD-10-CM

## 2025-04-16 PROCEDURE — 76775 US EXAM ABDO BACK WALL LIM: CPT

## 2025-04-17 DIAGNOSIS — M79.7 FIBROMYALGIA: ICD-10-CM

## 2025-04-17 DIAGNOSIS — F98.8 ATTENTION DEFICIT DISORDER (ADD) WITHOUT HYPERACTIVITY: ICD-10-CM

## 2025-04-18 RX ORDER — DULOXETIN HYDROCHLORIDE 60 MG/1
60 CAPSULE, DELAYED RELEASE ORAL DAILY
Qty: 90 CAPSULE | Refills: 3 | Status: SHIPPED | OUTPATIENT
Start: 2025-04-18

## 2025-04-22 RX ORDER — ATOMOXETINE 40 MG/1
40 CAPSULE ORAL DAILY
Qty: 90 CAPSULE | Refills: 3 | Status: SHIPPED | OUTPATIENT
Start: 2025-04-22

## 2025-04-23 ENCOUNTER — RESULTS FOLLOW-UP (OUTPATIENT)
Dept: NEPHROLOGY | Facility: CLINIC | Age: 80
End: 2025-04-23

## 2025-05-01 ENCOUNTER — APPOINTMENT (OUTPATIENT)
Age: 80
End: 2025-05-01

## 2025-05-01 DIAGNOSIS — E78.2 MIXED HYPERLIPIDEMIA: ICD-10-CM

## 2025-05-01 DIAGNOSIS — N18.31 CHRONIC RENAL FAILURE, STAGE 3A (HCC): ICD-10-CM

## 2025-05-01 DIAGNOSIS — N18.31 STAGE 3A CHRONIC KIDNEY DISEASE (HCC): ICD-10-CM

## 2025-05-01 DIAGNOSIS — M89.9 CHRONIC KIDNEY DISEASE-MINERAL AND BONE DISORDER (CKD-MBD): ICD-10-CM

## 2025-05-01 DIAGNOSIS — E66.9 OBESITY (BMI 30-39.9): ICD-10-CM

## 2025-05-01 DIAGNOSIS — R80.9 PROTEINURIA, UNSPECIFIED TYPE: ICD-10-CM

## 2025-05-01 DIAGNOSIS — N18.9 CHRONIC KIDNEY DISEASE-MINERAL AND BONE DISORDER (CKD-MBD): ICD-10-CM

## 2025-05-01 DIAGNOSIS — Z00.6 ENCOUNTER FOR EXAMINATION FOR NORMAL COMPARISON OR CONTROL IN CLINICAL RESEARCH PROGRAM: ICD-10-CM

## 2025-05-01 DIAGNOSIS — E83.9 CHRONIC KIDNEY DISEASE-MINERAL AND BONE DISORDER (CKD-MBD): ICD-10-CM

## 2025-05-01 DIAGNOSIS — R73.03 PRE-DIABETES: ICD-10-CM

## 2025-05-01 DIAGNOSIS — E78.5 DYSLIPIDEMIA: ICD-10-CM

## 2025-05-01 PROCEDURE — 36415 COLL VENOUS BLD VENIPUNCTURE: CPT

## 2025-05-11 LAB
APOB+LDLR+PCSK9 GENE MUT ANL BLD/T: NOT DETECTED
BRCA1+BRCA2 DEL+DUP + FULL MUT ANL BLD/T: NOT DETECTED
MLH1+MSH2+MSH6+PMS2 GN DEL+DUP+FUL M: NOT DETECTED

## 2025-06-25 ENCOUNTER — HOSPITAL ENCOUNTER (OUTPATIENT)
Dept: MAMMOGRAPHY | Facility: MEDICAL CENTER | Age: 80
Discharge: HOME | End: 2025-06-25
Attending: NURSE PRACTITIONER
Payer: COMMERCIAL

## 2025-06-25 VITALS — WEIGHT: 198 LBS | BODY MASS INDEX: 37.38 KG/M2 | HEIGHT: 61 IN

## 2025-06-25 DIAGNOSIS — Z12.31 ENCOUNTER FOR SCREENING MAMMOGRAM FOR MALIGNANT NEOPLASM OF BREAST: ICD-10-CM

## 2025-06-25 PROCEDURE — 77067 SCR MAMMO BI INCL CAD: CPT

## 2025-06-25 PROCEDURE — 77063 BREAST TOMOSYNTHESIS BI: CPT

## 2025-07-21 DIAGNOSIS — E78.5 DYSLIPIDEMIA: ICD-10-CM

## 2025-07-23 RX ORDER — EZETIMIBE 10 MG/1
10 TABLET ORAL DAILY
Qty: 90 TABLET | Refills: 1 | Status: SHIPPED | OUTPATIENT
Start: 2025-07-23

## 2025-07-29 DIAGNOSIS — F32.A DEPRESSIVE DISORDER: ICD-10-CM

## 2025-07-29 RX ORDER — BUPROPION HYDROCHLORIDE 150 MG/1
150 TABLET ORAL EVERY MORNING
Qty: 90 TABLET | Refills: 3 | Status: SHIPPED | OUTPATIENT
Start: 2025-07-29 | End: 2025-08-01 | Stop reason: CLARIF

## 2025-08-01 ENCOUNTER — NURSE TRIAGE (OUTPATIENT)
Age: 80
End: 2025-08-01

## 2025-08-01 ENCOUNTER — HOSPITAL ENCOUNTER (OUTPATIENT)
Dept: SLEEP CENTER | Facility: CLINIC | Age: 80
Discharge: HOME | End: 2025-08-01
Payer: MEDICARE

## 2025-08-01 DIAGNOSIS — F32.A DEPRESSIVE DISORDER: ICD-10-CM

## 2025-08-01 DIAGNOSIS — G47.33 OBSTRUCTIVE SLEEP APNEA OF ADULT: ICD-10-CM

## 2025-08-01 PROCEDURE — 95810 POLYSOM 6/> YRS 4/> PARAM: CPT

## 2025-08-01 RX ORDER — BUPROPION HYDROCHLORIDE 150 MG/1
150 TABLET ORAL EVERY MORNING
Qty: 90 TABLET | Refills: 3 | Status: SHIPPED | OUTPATIENT
Start: 2025-08-01

## 2025-08-05 PROBLEM — Z86.69 HISTORY OF SLEEP APNEA: Status: ACTIVE | Noted: 2025-08-05

## 2025-08-06 ENCOUNTER — APPOINTMENT (OUTPATIENT)
Age: 80
End: 2025-08-06
Payer: MEDICARE

## 2025-08-06 LAB
ALBUMIN SERPL BCG-MCNC: 3.8 G/DL (ref 3.5–5)
ALP SERPL-CCNC: 111 U/L (ref 34–104)
ALT SERPL W P-5'-P-CCNC: 13 U/L (ref 7–52)
ANION GAP SERPL CALCULATED.3IONS-SCNC: 14 MMOL/L (ref 4–13)
AST SERPL W P-5'-P-CCNC: 20 U/L (ref 13–39)
BILIRUB SERPL-MCNC: 0.6 MG/DL (ref 0.2–1)
BUN SERPL-MCNC: 28 MG/DL (ref 5–25)
CALCIUM SERPL-MCNC: 9.2 MG/DL (ref 8.4–10.2)
CHLORIDE SERPL-SCNC: 103 MMOL/L (ref 96–108)
CHOLEST SERPL-MCNC: 228 MG/DL (ref ?–200)
CO2 SERPL-SCNC: 23 MMOL/L (ref 21–32)
CREAT SERPL-MCNC: 1.17 MG/DL (ref 0.6–1.3)
GFR SERPL CREATININE-BSD FRML MDRD: 45 ML/MIN/1.73SQ M
GLUCOSE P FAST SERPL-MCNC: 103 MG/DL (ref 65–99)
HDLC SERPL-MCNC: 74 MG/DL
LDLC SERPL CALC-MCNC: 133 MG/DL (ref 0–100)
NONHDLC SERPL-MCNC: 154 MG/DL
POTASSIUM SERPL-SCNC: 3.9 MMOL/L (ref 3.5–5.3)
PROT SERPL-MCNC: 6.9 G/DL (ref 6.4–8.4)
SODIUM SERPL-SCNC: 140 MMOL/L (ref 135–147)
TRIGL SERPL-MCNC: 107 MG/DL (ref ?–150)

## 2025-08-15 ENCOUNTER — OFFICE VISIT (OUTPATIENT)
Age: 80
End: 2025-08-15
Payer: MEDICARE